# Patient Record
Sex: MALE | Race: WHITE | NOT HISPANIC OR LATINO
[De-identification: names, ages, dates, MRNs, and addresses within clinical notes are randomized per-mention and may not be internally consistent; named-entity substitution may affect disease eponyms.]

---

## 2019-08-04 PROBLEM — Z00.00 ENCOUNTER FOR PREVENTIVE HEALTH EXAMINATION: Status: ACTIVE | Noted: 2019-08-04

## 2019-08-30 ENCOUNTER — LABORATORY RESULT (OUTPATIENT)
Age: 34
End: 2019-08-30

## 2019-08-30 ENCOUNTER — APPOINTMENT (OUTPATIENT)
Dept: GASTROENTEROLOGY | Facility: CLINIC | Age: 34
End: 2019-08-30
Payer: COMMERCIAL

## 2019-08-30 VITALS
WEIGHT: 124 LBS | BODY MASS INDEX: 16.44 KG/M2 | HEIGHT: 73 IN | SYSTOLIC BLOOD PRESSURE: 100 MMHG | DIASTOLIC BLOOD PRESSURE: 70 MMHG | HEART RATE: 110 BPM | TEMPERATURE: 98.2 F | OXYGEN SATURATION: 97 %

## 2019-08-30 DIAGNOSIS — R63.4 ABNORMAL WEIGHT LOSS: ICD-10-CM

## 2019-08-30 DIAGNOSIS — M25.50 PAIN IN UNSPECIFIED JOINT: ICD-10-CM

## 2019-08-30 DIAGNOSIS — E53.8 DEFICIENCY OF OTHER SPECIFIED B GROUP VITAMINS: ICD-10-CM

## 2019-08-30 DIAGNOSIS — R51 HEADACHE: ICD-10-CM

## 2019-08-30 DIAGNOSIS — Z87.19 PERSONAL HISTORY OF OTHER DISEASES OF THE DIGESTIVE SYSTEM: ICD-10-CM

## 2019-08-30 PROCEDURE — 96372 THER/PROPH/DIAG INJ SC/IM: CPT

## 2019-08-30 PROCEDURE — 36415 COLL VENOUS BLD VENIPUNCTURE: CPT

## 2019-08-30 PROCEDURE — 99204 OFFICE O/P NEW MOD 45 MIN: CPT | Mod: 25

## 2019-08-30 RX ORDER — CYANOCOBALAMIN 1000 UG/ML
1000 INJECTION INTRAMUSCULAR; SUBCUTANEOUS
Qty: 0 | Refills: 0 | Status: COMPLETED | OUTPATIENT
Start: 2019-08-30

## 2019-08-30 RX ADMIN — CYANOCOBALAMIN 1000 MCG/ML: 1000 INJECTION INTRAMUSCULAR; SUBCUTANEOUS at 00:00

## 2019-08-30 NOTE — ASSESSMENT
[FreeTextEntry1] : 33M with PMHx anxiety and arthritis in wrists self referred for evaluation of SIBO and constipation. \par \par History of SIBO likely due to longstanding motility issue from constipation and history of food poisoning\par - discussed SIBO in detail, advised patient to give himself 4 hour intervals between meals \par - continue low fodmap diet for now but discussed not a good diet long term for microbiome \par - evaluate for malabsorption by checking vitamin A, E, K, prealbumin, vitamin B1, B2, b3 B6 magnesium and phosphorus. Vitamin D previously checked- 34.9\par - vitamin B12 low therefore Vitamin B12 injection today and recheck vitamin b12 and folate levels as well as anti-parietal and intrinsic factors\par - food allergy panel and specific foods tested patient identified \par - immunoglobulin panel, HIV screening, celiac genetic testing fecal calprotectin, fecal elastase, fecal fat collection, and ova and parasites \par - repeat SIBO breath test at [place] \par \par F/U with results\par \par Carolyn Arboleda NP

## 2019-08-30 NOTE — HISTORY OF PRESENT ILLNESS
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 \par \par Has had constipation for at least 5 years and was experiencing brain fog \par Diagnosed SIBO in March 2019- TREATED rifaximin, did not want neomycin because of tinnitus - did not help symptoms \par tries to do low fodmap - has slight bloating \par denies gas, stomach pain, blood in stool \par can't pinpoint foods that bother him, stopping caffeine helped a little bit, he has done a food diary\par lost 10 pounds in last year- changed diet (gluten free, dairy-free), chicken, fish, turkey, lamb, grass- fed red meat \par \par Now- brain fog with sleep disruption (waking up after 4-6 hours), constipation- everyday little bits  (does not get much of an urge) , palpitations \par \par : between 6 mo to a year \par delivery: vaginal\par PPI: none\par Infections: appendicitis in 2016 then appendectomy \par antibiotics: strep throat last year took antibiotic course\par motility: constipation \par 2 weeks after appendectomy went to [place] and got horrible food poisoning \par \par Fhx: father had bladder cancer (dx 45), brother has thyroid cancer (dx 37) \par Etoh: rarely \par Smoking: none\par Drug use: none\par was taking digestive enzymes \par \par blood work at previous GI showed platelets to be 133, Free T3 1.39, Vitamin B12 404, SIBO methane positive, otherwise normal labs

## 2019-08-30 NOTE — PHYSICAL EXAM
[General Appearance - Alert] : alert [General Appearance - In No Acute Distress] : in no acute distress [General Appearance - Well Nourished] : well nourished [Outer Ear] : the ears and nose were normal in appearance [Oropharynx] : the oropharynx was normal [Neck Appearance] : the appearance of the neck was normal [Neck Cervical Mass (___cm)] : no neck mass was observed [Heart Rate And Rhythm] : heart rate was normal and rhythm regular [Respiration, Rhythm And Depth] : normal respiratory rhythm and effort [Edema] : there was no peripheral edema [Bowel Sounds] : normal bowel sounds [Abdomen Soft] : soft [Abdomen Tenderness] : non-tender [Skin Color & Pigmentation] : normal skin color and pigmentation [Abnormal Walk] : normal gait [Skin Turgor] : normal skin turgor [] : no rash [Oriented To Time, Place, And Person] : oriented to person, place, and time [Impaired Insight] : insight and judgment were intact [Affect] : the affect was normal

## 2019-09-03 LAB
CRP SERPL-MCNC: <0.1 MG/DL
DEPRECATED KAPPA LC FREE/LAMBDA SER: 1.65 RATIO
FOLATE SERPL-MCNC: 8.1 NG/ML
HIV1+2 AB SPEC QL IA.RAPID: NONREACTIVE
IF BLOCK AB SER QL: NORMAL
IGA SER QL IEP: 313 MG/DL
IGG SER QL IEP: 921 MG/DL
IGM SER QL IEP: 76 MG/DL
KAPPA LC CSF-MCNC: 0.97 MG/DL
KAPPA LC SERPL-MCNC: 1.6 MG/DL
PCA AB SER QL IF: NORMAL
VIT B12 SERPL-MCNC: >2000 PG/ML

## 2019-09-04 ENCOUNTER — RESULT REVIEW (OUTPATIENT)
Age: 34
End: 2019-09-04

## 2019-09-05 LAB
A-TOCOPHEROL VIT E SERPL-MCNC: 17.1 MG/L
BETA+GAMMA TOCOPHEROL SERPL-MCNC: 1 MG/L
MAGNESIUM SERPL-MCNC: 2.4 MG/DL
PHOSPHATE SERPL-MCNC: 3 MG/DL
PREALB SERPL NEPH-MCNC: 27 MG/DL
VIT A SERPL-MCNC: 50.4 UG/DL

## 2019-09-06 LAB
ALMOND IGE QN: <0.1 KUA/L
AVACADO (F96) CONC: <0.1 KUA/L
CALPROTECTIN FECAL: <16 UG/G
DEPRECATED ALMOND IGE RAST QL: 0
DEPRECATED AVOCADO IGE RAST QL: 0
DEPRECATED KIWIFRUIT IGE RAST QL: <0.1 KUA/L
DEPRECATED ORANGE IGE RAST QL: 0
DEPRECATED PUMPKIN IGE RAST QL: 0
DEPRECATED SESAME SEED IGE RAST QL: 0
DEPRECATED STRAWBERRY IGE RAST QL: 0
DEPRECATED SUNFLOWER SEED IGE RAST QL: 0
DEPRECATED WALNUT IGE RAST QL: 0
KIWIFRUIT IGE QN: 0
ORANGE IGE QN: <0.1 KUA/L
PUMPKIN IGE QN: <0.1 KUA/L
SESAME SEED IGE QN: <0.1 KUA/L
STRAWBERRY IGE QN: <0.1 KUA/L
SUNFLOWER SEED IGE QN: <0.1 KUA/L
WALNUT IGE QN: <0.1 KUA/L

## 2019-09-09 LAB
BARLEY IGE QN: <0.1 KUA/L
BASOPHILS # BLD AUTO: 0.04 K/UL
BASOPHILS NFR BLD AUTO: 1.2 %
CHERRY IGE QN: <0.1 KUA/L
COW MILK IGE QN: <0.1 KUA/L
CRAB IGE QN: <0.1 KUA/L
DEPRECATED BARLEY IGE RAST QL: 0
DEPRECATED CHERRY IGE RAST QL: 0
DEPRECATED COW MILK IGE RAST QL: 0
DEPRECATED CRAB IGE RAST QL: 0
DEPRECATED EGG WHITE IGE RAST QL: 0
DEPRECATED OAT IGE RAST QL: 0
DEPRECATED PEANUT IGE RAST QL: 0
DEPRECATED RYE IGE RAST QL: 0
DEPRECATED SOYBEAN IGE RAST QL: 0
DEPRECATED WHEAT IGE RAST QL: 0
EGG WHITE IGE QN: <0.1 KUA/L
EOSINOPHIL # BLD AUTO: 0.06 K/UL
EOSINOPHIL NFR BLD AUTO: 1.8 %
HCT VFR BLD CALC: 43.6 %
HGB BLD-MCNC: 14.3 G/DL
IMM GRANULOCYTES NFR BLD AUTO: 0.3 %
LYMPHOCYTES # BLD AUTO: 1.19 K/UL
LYMPHOCYTES NFR BLD AUTO: 36 %
MAN DIFF?: NORMAL
MCHC RBC-ENTMCNC: 30.2 PG
MCHC RBC-ENTMCNC: 32.8 GM/DL
MCV RBC AUTO: 92.2 FL
MONOCYTES # BLD AUTO: 0.27 K/UL
MONOCYTES NFR BLD AUTO: 8.2 %
NEUTROPHILS # BLD AUTO: 1.74 K/UL
NEUTROPHILS NFR BLD AUTO: 52.5 %
OAT IGE QN: <0.1 KUA/L
PEANUT IGE QN: <0.1 KUA/L
PLATELET # BLD AUTO: 139 K/UL
RBC # BLD: 4.73 M/UL
RBC # FLD: 13.8 %
RYE IGE QN: <0.1 KUA/L
SOYBEAN IGE QN: <0.1 KUA/L
TOTAL IGE SMQN RAST: 3 KU/L
WBC # FLD AUTO: 3.31 K/UL
WHEAT IGE QN: <0.1 KUA/L

## 2019-09-11 LAB — DEPRECATED O AND P PREP STL: NORMAL

## 2019-09-13 ENCOUNTER — TRANSCRIPTION ENCOUNTER (OUTPATIENT)
Age: 34
End: 2019-09-13

## 2019-09-16 LAB
ANNOTATION COMMENT IMP: NORMAL
HLA-DQ2: POSITIVE
HLA-DQ8 QL: NEGATIVE
REF LAB TEST METHOD: NORMAL

## 2019-09-23 ENCOUNTER — TRANSCRIPTION ENCOUNTER (OUTPATIENT)
Age: 34
End: 2019-09-23

## 2019-09-26 ENCOUNTER — TRANSCRIPTION ENCOUNTER (OUTPATIENT)
Age: 34
End: 2019-09-26

## 2019-09-26 LAB
COPPER SERPL-MCNC: 98 UG/DL
INR PPP: 1.04 RATIO
NICOTINAMIDE: 20.7 NG/ML
NICOTINIC ACID: <5 NG/ML
PANCREATIC ELASTASE, FECAL: 253
PT BLD: 11.8 SEC
VIT B1 SERPL-MCNC: 104.4 NMOL/L
VIT B2 SERPL-MCNC: 175 UG/L
VIT B6 SERPL-MCNC: 46.5 UG/L
ZINC SERPL-MCNC: 60 UG/DL

## 2019-09-27 ENCOUNTER — TRANSCRIPTION ENCOUNTER (OUTPATIENT)
Age: 34
End: 2019-09-27

## 2019-10-11 ENCOUNTER — APPOINTMENT (OUTPATIENT)
Dept: GASTROENTEROLOGY | Facility: CLINIC | Age: 34
End: 2019-10-11
Payer: COMMERCIAL

## 2019-10-11 VITALS
HEIGHT: 73 IN | RESPIRATION RATE: 14 BRPM | SYSTOLIC BLOOD PRESSURE: 130 MMHG | BODY MASS INDEX: 16.44 KG/M2 | DIASTOLIC BLOOD PRESSURE: 80 MMHG | HEART RATE: 79 BPM | WEIGHT: 124 LBS | OXYGEN SATURATION: 98 %

## 2019-10-11 DIAGNOSIS — D69.6 THROMBOCYTOPENIA, UNSPECIFIED: ICD-10-CM

## 2019-10-11 PROCEDURE — 99215 OFFICE O/P EST HI 40 MIN: CPT

## 2019-10-11 NOTE — PHYSICAL EXAM
[General Appearance - Alert] : alert [General Appearance - In No Acute Distress] : in no acute distress [General Appearance - Well Nourished] : well nourished [Outer Ear] : the ears and nose were normal in appearance [Oropharynx] : the oropharynx was normal [Neck Appearance] : the appearance of the neck was normal [Neck Cervical Mass (___cm)] : no neck mass was observed [Respiration, Rhythm And Depth] : normal respiratory rhythm and effort [Heart Rate And Rhythm] : heart rate was normal and rhythm regular [Edema] : there was no peripheral edema [Bowel Sounds] : normal bowel sounds [Abdomen Soft] : soft [Abdomen Tenderness] : non-tender [Abnormal Walk] : normal gait [Skin Color & Pigmentation] : normal skin color and pigmentation [Skin Turgor] : normal skin turgor [] : no rash [Oriented To Time, Place, And Person] : oriented to person, place, and time [Impaired Insight] : insight and judgment were intact [Affect] : the affect was normal

## 2019-10-14 NOTE — ASSESSMENT
[FreeTextEntry1] : 33M with PMHx anxiety and arthritis in wrists self referred for evaluation of SIBO and constipation. \par \par History of SIBO likely due to longstanding motility issue from constipation and history of food poisoning\par - advised patient to continue with  4 hour intervals between meals \par - continue low fodmap diet for now but discussed not a good diet long term for microbiome \par - recent SIBO breath test- negative, therefore schedule patient for SmartPill and possible Motegrity depending on results \par - encouraged patient to discontinue supplements as taking several \par \par Low platelets/increased WBCs \par - Complete abdominal ultrasound \par \par F/U post SmartPill study \par \par Carolyn Arboleda, NP

## 2019-10-14 NOTE — HISTORY OF PRESENT ILLNESS
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 \par \par 10/11/19\par - brain fog, not sleeping well, body fatigue \par - little bits of stool daily for 5 years - sometimes takes magnesium citrate at night \par - never had EGD/colonoscopy\par - had b12 injection at last visit- did not notice a difference \par - low WBC/PLTs, HLA DQ2 positive \par - currently taking digest cold, triphala, oregano oil, vitamin D, Atrantil \par - in the past took alimed, berberine, motilpro\par \par \par Previous history:\par Has had constipation for at least 5 years and was experiencing brain fog \par Diagnosed SIBO in March 2019- TREATED rifaximin, did not want neomycin because of tinnitus - did not help symptoms \par tries to do low fodmap - has slight bloating \par denies gas, stomach pain, blood in stool \par can't pinpoint foods that bother him, stopping caffeine helped a little bit, he has done a food diary\par lost 10 pounds in last year- changed diet (gluten free, dairy-free), chicken, fish, turkey, lamb, grass- fed red meat \par \par Now- brain fog with sleep disruption (waking up after 4-6 hours), constipation- everyday little bits  (does not get much of an urge) , palpitations \par \par : between 6 mo to a year \par delivery: vaginal\par PPI: none\par Infections: appendicitis in 2016 then appendectomy \par antibiotics: strep throat last year took antibiotic course\par motility: constipation \par 2 weeks after appendectomy went to [place] and got horrible food poisoning \par \par Fhx: father had bladder cancer (dx 45), brother has thyroid cancer (dx 37) \par Etoh: rarely \par Smoking: none\par Drug use: none\par was taking digestive enzymes \par \par blood work at previous GI showed platelets to be 133, Free T3 1.39, Vitamin B12 404, SIBO methane positive, otherwise normal labs

## 2019-10-16 ENCOUNTER — APPOINTMENT (OUTPATIENT)
Dept: GASTROENTEROLOGY | Facility: CLINIC | Age: 34
End: 2019-10-16
Payer: COMMERCIAL

## 2019-10-16 VITALS
BODY MASS INDEX: 16.44 KG/M2 | WEIGHT: 124 LBS | HEART RATE: 63 BPM | OXYGEN SATURATION: 100 % | DIASTOLIC BLOOD PRESSURE: 75 MMHG | HEIGHT: 73 IN | RESPIRATION RATE: 14 BRPM | SYSTOLIC BLOOD PRESSURE: 90 MMHG

## 2019-10-16 VITALS — SYSTOLIC BLOOD PRESSURE: 111 MMHG | DIASTOLIC BLOOD PRESSURE: 78 MMHG

## 2019-10-16 VITALS — TEMPERATURE: 97.6 F

## 2019-10-16 PROCEDURE — 91112 GI WIRELESS CAPSULE MEASURE: CPT

## 2019-10-16 NOTE — HISTORY OF PRESENT ILLNESS
[de-identified] : 34M with PMHx anxiety and arthritis in wrists here for SmartPill Administration for evaluation of constipation \par \par 10/16/19\par - Patient presents today for SmartPill Administration. \par - Has not taken any laxatives or antacids in preparation for procedure. Still complains of constipation. Reports he did not sleep well last night and woke up at 2 am. He wonders if his gut issues could be causing sleep disturbances or visa versa. His initial blood pressure was 90/75 and attributes to lack of sleep and fasting overnight. Denies dizziness. He is anxious for results of study. \par \par Previous history: \par 10/11/19\par - brain fog, not sleeping well, body fatigue \par - little bits of stool daily for 5 years - sometimes takes magnesium citrate at night \par - never had EGD/colonoscopy\par - had b12 injection at last visit- did not notice a difference \par - low WBC/PLTs, HLA DQ2 positive \par - currently taking digest cold, triphala, oregano oil, vitamin D, Atrantil \par - in the past took alimed, berberine, motilpro\par \par Previous history:\par Has had constipation for at least 5 years and was experiencing brain fog \par Diagnosed SIBO in March 2019- TREATED rifaximin, did not want neomycin because of tinnitus - did not help symptoms \par tries to do low fodmap - has slight bloating \par denies gas, stomach pain, blood in stool \par can't pinpoint foods that bother him, stopping caffeine helped a little bit, he has done a food diary\par lost 10 pounds in last year- changed diet (gluten free, dairy-free), chicken, fish, turkey, lamb, grass- fed red meat \par \par Now- brain fog with sleep disruption (waking up after 4-6 hours), constipation- everyday little bits  (does not get much of an urge) , palpitations \par \par : between 6 mo to a year \par delivery: vaginal\par PPI: none\par Infections: appendicitis in 2016 then appendectomy \par antibiotics: strep throat last year took antibiotic course\par motility: constipation \par 2 weeks after appendectomy went to [place] and got horrible food poisoning \par \par Fhx: father had bladder cancer (dx 45), brother has thyroid cancer (dx 37) \par Etoh: rarely \par Smoking: none\par Drug use: none\par was taking digestive enzymes \par \par blood work at previous GI showed platelets to be 133, Free T3 1.39, Vitamin B12 404, SIBO methane positive, otherwise normal labs

## 2019-10-16 NOTE — HISTORY OF PRESENT ILLNESS
[de-identified] : 34M with PMHx anxiety and arthritis in wrists here for SmartPill Administration for evaluation of constipation \par \par 10/16/19\par - Patient presents today for SmartPill Administration. \par - Has not taken any laxatives or antacids in preparation for procedure. Still complains of constipation. Reports he did not sleep well last night and woke up at 2 am. He wonders if his gut issues could be causing sleep disturbances or visa versa. His initial blood pressure was 90/75 and attributes to lack of sleep and fasting overnight. Denies dizziness. He is anxious for results of study. \par \par Previous history: \par 10/11/19\par - brain fog, not sleeping well, body fatigue \par - little bits of stool daily for 5 years - sometimes takes magnesium citrate at night \par - never had EGD/colonoscopy\par - had b12 injection at last visit- did not notice a difference \par - low WBC/PLTs, HLA DQ2 positive \par - currently taking digest cold, triphala, oregano oil, vitamin D, Atrantil \par - in the past took alimed, berberine, motilpro\par \par Previous history:\par Has had constipation for at least 5 years and was experiencing brain fog \par Diagnosed SIBO in March 2019- TREATED rifaximin, did not want neomycin because of tinnitus - did not help symptoms \par tries to do low fodmap - has slight bloating \par denies gas, stomach pain, blood in stool \par can't pinpoint foods that bother him, stopping caffeine helped a little bit, he has done a food diary\par lost 10 pounds in last year- changed diet (gluten free, dairy-free), chicken, fish, turkey, lamb, grass- fed red meat \par \par Now- brain fog with sleep disruption (waking up after 4-6 hours), constipation- everyday little bits  (does not get much of an urge) , palpitations \par \par : between 6 mo to a year \par delivery: vaginal\par PPI: none\par Infections: appendicitis in 2016 then appendectomy \par antibiotics: strep throat last year took antibiotic course\par motility: constipation \par 2 weeks after appendectomy went to [place] and got horrible food poisoning \par \par Fhx: father had bladder cancer (dx 45), brother has thyroid cancer (dx 37) \par Etoh: rarely \par Smoking: none\par Drug use: none\par was taking digestive enzymes \par \par blood work at previous GI showed platelets to be 133, Free T3 1.39, Vitamin B12 404, SIBO methane positive, otherwise normal labs

## 2019-10-16 NOTE — PHYSICAL EXAM
[General Appearance - Alert] : alert [General Appearance - In No Acute Distress] : in no acute distress [General Appearance - Well Nourished] : well nourished [] : no respiratory distress [Respiration, Rhythm And Depth] : normal respiratory rhythm and effort [Heart Rate And Rhythm] : heart rate was normal and rhythm regular [Bowel Sounds] : normal bowel sounds [Abdomen Soft] : soft [Abdomen Tenderness] : non-tender [Oriented To Time, Place, And Person] : oriented to person, place, and time [Impaired Insight] : insight and judgment were intact [Affect] : the affect was normal

## 2019-10-16 NOTE — ASSESSMENT
[FreeTextEntry1] : Impression: 34M with PMHx anxiety and arthritis in wrists here for SmartPill Administration for evaluation of constipation to determine if underlying cause is dysmotility. \par \par SmartPill Administration:\par Patient presented to office having fasted for 8 hours.  I assessed for contraindications to SmartPill, none of which are present. We reviewed adverse events of SmartPill administration including capsule retention.  Patient ate the meal bar in the allotted 15 minutes and ingested the capsule without difficulty.  He was instructed to fast for an additional 6 hours during which he may sip one cup of water.  We discussed SmartPill recorder and diary use.  Patient verbalizes understanding. \par \par Rechecked BP after eating SmartBar and drinking half a cup of water, repeat reading 111/78. \par \par Disposition: Patient will return the SmartPill recorder and diary on Tuesday, October 22nd, 2019. He is instructed to contact the office if any questions arise.

## 2019-10-17 ENCOUNTER — FORM ENCOUNTER (OUTPATIENT)
Age: 34
End: 2019-10-17

## 2019-10-18 ENCOUNTER — APPOINTMENT (OUTPATIENT)
Dept: ULTRASOUND IMAGING | Facility: CLINIC | Age: 34
End: 2019-10-18
Payer: COMMERCIAL

## 2019-10-18 ENCOUNTER — OUTPATIENT (OUTPATIENT)
Dept: OUTPATIENT SERVICES | Facility: HOSPITAL | Age: 34
LOS: 1 days | End: 2019-10-18

## 2019-10-18 PROCEDURE — 76700 US EXAM ABDOM COMPLETE: CPT | Mod: 26

## 2019-10-21 ENCOUNTER — TRANSCRIPTION ENCOUNTER (OUTPATIENT)
Age: 34
End: 2019-10-21

## 2019-10-24 ENCOUNTER — TRANSCRIPTION ENCOUNTER (OUTPATIENT)
Age: 34
End: 2019-10-24

## 2019-10-29 ENCOUNTER — TRANSCRIPTION ENCOUNTER (OUTPATIENT)
Age: 34
End: 2019-10-29

## 2019-11-05 ENCOUNTER — APPOINTMENT (OUTPATIENT)
Dept: GASTROENTEROLOGY | Facility: CLINIC | Age: 34
End: 2019-11-05
Payer: COMMERCIAL

## 2019-11-05 PROCEDURE — 91112 GI WIRELESS CAPSULE MEASURE: CPT | Mod: 26

## 2019-11-07 ENCOUNTER — TRANSCRIPTION ENCOUNTER (OUTPATIENT)
Age: 34
End: 2019-11-07

## 2019-11-11 ENCOUNTER — TRANSCRIPTION ENCOUNTER (OUTPATIENT)
Age: 34
End: 2019-11-11

## 2019-11-26 ENCOUNTER — APPOINTMENT (OUTPATIENT)
Dept: GASTROENTEROLOGY | Facility: CLINIC | Age: 34
End: 2019-11-26
Payer: COMMERCIAL

## 2019-11-26 VITALS
TEMPERATURE: 98.1 F | BODY MASS INDEX: 17.49 KG/M2 | WEIGHT: 132 LBS | SYSTOLIC BLOOD PRESSURE: 109 MMHG | RESPIRATION RATE: 15 BRPM | DIASTOLIC BLOOD PRESSURE: 81 MMHG | OXYGEN SATURATION: 98 % | HEART RATE: 95 BPM | HEIGHT: 73 IN

## 2019-11-26 PROCEDURE — 36415 COLL VENOUS BLD VENIPUNCTURE: CPT

## 2019-11-26 PROCEDURE — 99215 OFFICE O/P EST HI 40 MIN: CPT | Mod: 25

## 2019-11-26 NOTE — PHYSICAL EXAM
[General Appearance - Alert] : alert [Outer Ear] : the ears and nose were normal in appearance [General Appearance - In No Acute Distress] : in no acute distress [General Appearance - Well Nourished] : well nourished [Neck Appearance] : the appearance of the neck was normal [Oropharynx] : the oropharynx was normal [Neck Cervical Mass (___cm)] : no neck mass was observed [Respiration, Rhythm And Depth] : normal respiratory rhythm and effort [Heart Rate And Rhythm] : heart rate was normal and rhythm regular [Bowel Sounds] : normal bowel sounds [Edema] : there was no peripheral edema [Abdomen Soft] : soft [Abnormal Walk] : normal gait [Abdomen Tenderness] : non-tender [Skin Turgor] : normal skin turgor [Skin Color & Pigmentation] : normal skin color and pigmentation [] : no rash [Affect] : the affect was normal [Oriented To Time, Place, And Person] : oriented to person, place, and time [Impaired Insight] : insight and judgment were intact

## 2019-11-27 NOTE — ASSESSMENT
[FreeTextEntry1] : 33M with PMHx anxiety and arthritis in wrists self referred for evaluation of SIBO and constipation currently NEGATIVE for SIBO and found to have COLONIC SLOW TRANSIT MOTILITY on SMART PILL\par 1. Slow transit motility\par History of SIBO likely due to longstanding motility issue from constipation and history of food poisoning but no current SIBO\par - will attempt to correct with lifestyle/diet and bowel retraining including morning high fiber smoothie and sitting on toilet... if not improved can consider supplements, medications\par - continue with 4 hour intervals between meals \par - reintroduce fodmaps\par - check autoimmune/scleroderma\par \par 2. Low platelets/increased WBCs \par - Complete abdominal ultrasound - no splenomegaly\par - f/u with hematology\par \par 3. Sleep \par - continue melatonin, sleep hygiene, stress/anxiety reduction\par \par 4. Liver hemangioma and gallbladder polyps\par - MRI in one year for stability and if stable, continue GB polyp surveillance

## 2019-11-27 NOTE — HISTORY OF PRESENT ILLNESS
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 \par 11/27/19\par - pt here to f/u on smart pill test showing prolonged CTT at 78:33\par - feels some improvement as he is doing daily yoga in AM, sleeping better\par - went to see Dr. WEBER for workup- did not have official follow up yet\par - NEGative for SIBO\par \par 10/11/19\par - brain fog, not sleeping well, body fatigue \par - little bits of stool daily for 5 years - sometimes takes magnesium citrate at night \par - never had EGD/colonoscopy\par - had b12 injection at last visit- did not notice a difference \par - low WBC/PLTs, HLA DQ2 positive \par - currently taking digest cold, triphala, oregano oil, vitamin D, Atrantil \par - in the past took alimed, berberine, motilpro\par \par \par Previous history:\par Has had constipation for at least 5 years and was experiencing brain fog \par Diagnosed SIBO in March 2019- TREATED rifaximin, did not want neomycin because of tinnitus - did not help symptoms \par tries to do low fodmap - has slight bloating \par denies gas, stomach pain, blood in stool \par can't pinpoint foods that bother him, stopping caffeine helped a little bit, he has done a food diary\par lost 10 pounds in last year- changed diet (gluten free, dairy-free), chicken, fish, turkey, lamb, grass- fed red meat \par \par Now- brain fog with sleep disruption (waking up after 4-6 hours), constipation- everyday little bits  (does not get much of an urge) , palpitations \par \par : between 6 mo to a year \par delivery: vaginal\par PPI: none\par Infections: appendicitis in 2016 then appendectomy \par antibiotics: strep throat last year took antibiotic course\par motility: constipation \par 2 weeks after appendectomy went to [place] and got horrible food poisoning \par \par Fhx: father had bladder cancer (dx 45), brother has thyroid cancer (dx 37) \par Etoh: rarely \par Smoking: none\par Drug use: none\par was taking digestive enzymes \par \par blood work at previous GI showed platelets to be 133, Free T3 1.39, Vitamin B12 404, SIBO methane positive, otherwise normal labs

## 2019-11-29 ENCOUNTER — TRANSCRIPTION ENCOUNTER (OUTPATIENT)
Age: 34
End: 2019-11-29

## 2019-11-29 LAB
ANA SER IF-ACNC: NEGATIVE
ENA SCL70 IGG SER IA-ACNC: <0.2 AL

## 2019-12-04 ENCOUNTER — TRANSCRIPTION ENCOUNTER (OUTPATIENT)
Age: 34
End: 2019-12-04

## 2020-01-17 ENCOUNTER — TRANSCRIPTION ENCOUNTER (OUTPATIENT)
Age: 35
End: 2020-01-17

## 2020-01-23 ENCOUNTER — APPOINTMENT (OUTPATIENT)
Dept: GASTROENTEROLOGY | Facility: CLINIC | Age: 35
End: 2020-01-23
Payer: COMMERCIAL

## 2020-01-23 VITALS
WEIGHT: 138 LBS | HEIGHT: 73 IN | DIASTOLIC BLOOD PRESSURE: 70 MMHG | BODY MASS INDEX: 18.29 KG/M2 | HEART RATE: 84 BPM | OXYGEN SATURATION: 98 % | SYSTOLIC BLOOD PRESSURE: 100 MMHG | RESPIRATION RATE: 14 BRPM

## 2020-01-23 DIAGNOSIS — K59.04 CHRONIC IDIOPATHIC CONSTIPATION: ICD-10-CM

## 2020-01-23 PROCEDURE — 99215 OFFICE O/P EST HI 40 MIN: CPT

## 2020-01-24 ENCOUNTER — TRANSCRIPTION ENCOUNTER (OUTPATIENT)
Age: 35
End: 2020-01-24

## 2020-01-24 NOTE — HISTORY OF PRESENT ILLNESS
[de-identified] : 34M with PMHx anxiety and arthritis in wrists self referred for evaluation of SIBO and constipation. \par \par 1/23/20\par - feeling more anxious\par - brain fog for about 10 days last week\par - constipation, every day going to the bathroom a little bit then every 3 days will have a bigger BM \par - tried MiraLAX for the past 5 days (after lunch) - subtle difference \par - tried morning smoothie and caused brain fog for 5 hours - wonders if it is because he put yogurt in it \par - never have taken a stronger laxative \par - been skipping breakfast past week and feels somewhat better \par \par 11/27/19\par - pt here to f/u on smart pill test showing prolonged CTT at 78:33\par - feels some improvement as he is doing daily yoga in AM, sleeping better\par - went to see Dr. WEBER for workup- did not have official follow up yet\par - NEGative for SIBO\par \par 10/11/19\par - brain fog, not sleeping well, body fatigue \par - little bits of stool daily for 5 years - sometimes takes magnesium citrate at night \par - never had EGD/colonoscopy\par - had b12 injection at last visit- did not notice a difference \par - low WBC/PLTs, HLA DQ2 positive \par - currently taking digest cold, triphala, oregano oil, vitamin D, Atrantil \par - in the past took alimed, berberine, motilpro\par \par \par Previous history:\par Has had constipation for at least 5 years and was experiencing brain fog \par Diagnosed SIBO in March 2019- TREATED rifaximin, did not want neomycin because of tinnitus - did not help symptoms \par tries to do low fodmap - has slight bloating \par denies gas, stomach pain, blood in stool \par can't pinpoint foods that bother him, stopping caffeine helped a little bit, he has done a food diary\par lost 10 pounds in last year- changed diet (gluten free, dairy-free), chicken, fish, turkey, lamb, grass- fed red meat \par \par Now- brain fog with sleep disruption (waking up after 4-6 hours), constipation- everyday little bits  (does not get much of an urge) , palpitations \par \par : between 6 mo to a year \par delivery: vaginal\par PPI: none\par Infections: appendicitis in 2016 then appendectomy \par antibiotics: strep throat last year took antibiotic course\par motility: constipation \par 2 weeks after appendectomy went to [place] and got horrible food poisoning \par \par Fhx: father had bladder cancer (dx 45), brother has thyroid cancer (dx 37) \par Etoh: rarely \par Smoking: none\par Drug use: none\par was taking digestive enzymes \par \par blood work at previous GI showed platelets to be 133, Free T3 1.39, Vitamin B12 404, SIBO methane positive, otherwise normal labs

## 2020-01-24 NOTE — ASSESSMENT
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 Slow transit motility\par History of SIBO likely due to longstanding motility issue from constipation and history of food poisoning but no current SIBO\par - attempted diet/lifestlye changes and magnesium and miralax but no improvement therefore patient would like to try route of low dose motegrity, ordered 0.5mg for patient to take. Advised to take at bedtime, same time every day. Reviewed adverse effects with patient. \par - discontinue 4 hour intervals between meals since patient is essentially doing intermittent fasting\par - nutritionist referral provided \par \par 2. Low platelets/increased WBCs \par - Complete abdominal ultrasound - no splenomegaly\par - f/u with hematology\par \par 3. Sleep \par - continue melatonin, sleep hygiene, stress/anxiety reduction\par - highly recommended sleep specialist at [facility], referral given \par - recommend patient see therapist as anxiety seems to drive a lot of symptoms he has, referral provided\par \par 4. Liver hemangioma and gallbladder polyps\par - MRI in one year for stability and if stable, continue GB polyp surveillance\par \par 5. Anxiety\par - psych referral

## 2020-01-27 ENCOUNTER — TRANSCRIPTION ENCOUNTER (OUTPATIENT)
Age: 35
End: 2020-01-27

## 2020-02-03 ENCOUNTER — TRANSCRIPTION ENCOUNTER (OUTPATIENT)
Age: 35
End: 2020-02-03

## 2020-02-13 ENCOUNTER — TRANSCRIPTION ENCOUNTER (OUTPATIENT)
Age: 35
End: 2020-02-13

## 2020-02-20 ENCOUNTER — TRANSCRIPTION ENCOUNTER (OUTPATIENT)
Age: 35
End: 2020-02-20

## 2020-02-24 ENCOUNTER — TRANSCRIPTION ENCOUNTER (OUTPATIENT)
Age: 35
End: 2020-02-24

## 2020-02-27 ENCOUNTER — APPOINTMENT (OUTPATIENT)
Dept: GASTROENTEROLOGY | Facility: CLINIC | Age: 35
End: 2020-02-27
Payer: COMMERCIAL

## 2020-02-27 VITALS
DIASTOLIC BLOOD PRESSURE: 60 MMHG | RESPIRATION RATE: 16 BRPM | HEIGHT: 73 IN | OXYGEN SATURATION: 98 % | SYSTOLIC BLOOD PRESSURE: 90 MMHG | BODY MASS INDEX: 18.42 KG/M2 | WEIGHT: 139 LBS | HEART RATE: 92 BPM

## 2020-02-27 PROCEDURE — 99215 OFFICE O/P EST HI 40 MIN: CPT

## 2020-02-27 NOTE — PHYSICAL EXAM
[General Appearance - Alert] : alert [General Appearance - In No Acute Distress] : in no acute distress [General Appearance - Well Nourished] : well nourished [Outer Ear] : the ears and nose were normal in appearance [Oropharynx] : the oropharynx was normal [Neck Appearance] : the appearance of the neck was normal [Neck Cervical Mass (___cm)] : no neck mass was observed [Respiration, Rhythm And Depth] : normal respiratory rhythm and effort [Heart Rate And Rhythm] : heart rate was normal and rhythm regular [Edema] : there was no peripheral edema [Bowel Sounds] : normal bowel sounds [Abdomen Soft] : soft [Abdomen Tenderness] : non-tender [Abnormal Walk] : normal gait [Skin Color & Pigmentation] : normal skin color and pigmentation [Skin Turgor] : normal skin turgor [] : no rash [No Focal Deficits] : no focal deficits [Oriented To Time, Place, And Person] : oriented to person, place, and time [Impaired Insight] : insight and judgment were intact [Affect] : the affect was normal

## 2020-02-27 NOTE — ASSESSMENT
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 Slow transit motility\par History of SIBO likely due to longstanding motility issue from constipation and history of food poisoning but no current SIBO\par - attempted diet/lifestlye changes and magnesium and miralax that didn’t work-- but now on senna it is working and has improved other symptoms. Advised to take at bedtime, same time every day. \par - discontinue 4 hour intervals between meals since patient is essentially doing intermittent fasting\par - nutritionist referral \par \par 2. Low platelets/increased WBCs \par - Complete abdominal ultrasound - no splenomegaly\par - f/u with hematology\par \par 3. Sleep improving with improved bowel movements\par - continue melatonin, sleep hygiene, stress/anxiety reduction\par - sleep specialist at [facility] if sleeping worsens\par - recommend patient see therapist as anxiety seems to drive a lot of symptoms he has, referral provided\par \par 4. Liver hemangioma and gallbladder polyps\par - MRI in one year for stability and if stable, continue GB polyp surveillance\par \par 5. Anxiety\par - psych referral\par \par F/u 2 mo

## 2020-03-07 ENCOUNTER — TRANSCRIPTION ENCOUNTER (OUTPATIENT)
Age: 35
End: 2020-03-07

## 2020-04-28 ENCOUNTER — APPOINTMENT (OUTPATIENT)
Dept: GASTROENTEROLOGY | Facility: CLINIC | Age: 35
End: 2020-04-28
Payer: COMMERCIAL

## 2020-04-28 PROCEDURE — 99214 OFFICE O/P EST MOD 30 MIN: CPT | Mod: 95

## 2020-04-28 RX ORDER — PRUCALOPRIDE 1 MG/1
1 TABLET, FILM COATED ORAL
Qty: 30 | Refills: 0 | Status: DISCONTINUED | COMMUNITY
Start: 2020-01-23 | End: 2020-04-28

## 2020-04-28 NOTE — HISTORY OF PRESENT ILLNESS
[Home] : at home, [unfilled] , at the time of the visit. [Patient] : the patient [Self] : self [Other Location: e.g. Home (Enter Location, City,State)___] : at [unfilled] [de-identified] : 34M with PMHx anxiety and arthritis in wrists self referred for evaluation of SIBO and constipation, here for follow up \par \par 4/28/20 \par - pt requests TELEMEDICINE visit today for constipation \par - eating healthy and has been cooking most meals at home\par - drinking senna tea and has magnesium citrate on hand\par - senna gives him urge to go bathroom - no negative side effects \par - had bad brain fog for 7 days so has been taking Zinc and feels better, however unsure if from Zinc or mushrooms he was eating \par - sleeping somewhat better \par - saw Dr. Burrell regarding low platelets and pt reports he was not concerned \par - patient still unsure regarding diet and certain foods that may be causing to brain fog, appt with Linda from nutrition next month \par \par 2/27/20\par - pt feeling much improved- having improved bowel movements even sometimes loose\par - less brain fog, better sleeping\par - able to expand diet\par - did not see psych or sleep docs\par \par \par 1/23/20\par - feeling more anxious\par - brain fog for about 10 days last week\par - constipation, every day going to the bathroom a little bit then every 3 days will have a bigger BM \par - tried MiraLAX for the past 5 days (after lunch) - subtle difference \par - tried morning smoothie and caused brain fog for 5 hours - wonders if it is because he put yogurt in it \par - never have taken a stronger laxative \par - been skipping breakfast past week and feels somewhat better \par \par 11/27/19\par - pt here to f/u on smart pill test showing prolonged CTT at 78:33\par - feels some improvement as he is doing daily yoga in AM, sleeping better\par - went to see Dr. WEBER for workup- did not have official follow up yet\par - NEGative for SIBO\par \par 10/11/19\par - brain fog, not sleeping well, body fatigue \par - little bits of stool daily for 5 years - sometimes takes magnesium citrate at night \par - never had EGD/colonoscopy\par - had b12 injection at last visit- did not notice a difference \par - low WBC/PLTs, HLA DQ2 positive \par - currently taking digest cold, triphala, oregano oil, vitamin D, Atrantil \par - in the past took alimed, berberine, motilpro\par \par \par Previous history:\par Has had constipation for at least 5 years and was experiencing brain fog \par Diagnosed SIBO in March 2019- TREATED rifaximin, did not want neomycin because of tinnitus - did not help symptoms \par tries to do low fodmap - has slight bloating \par denies gas, stomach pain, blood in stool \par can't pinpoint foods that bother him, stopping caffeine helped a little bit, he has done a food diary\par lost 10 pounds in last year- changed diet (gluten free, dairy-free), chicken, fish, turkey, lamb, grass- fed red meat \par \par Now- brain fog with sleep disruption (waking up after 4-6 hours), constipation- everyday little bits  (does not get much of an urge) , palpitations \par \par : between 6 mo to a year \par delivery: vaginal\par PPI: none\par Infections: appendicitis in 2016 then appendectomy \par antibiotics: strep throat last year took antibiotic course\par motility: constipation \par 2 weeks after appendectomy went to [place] and got horrible food poisoning \par \par Fhx: father had bladder cancer (dx 45), brother has thyroid cancer (dx 37) \par Etoh: rarely \par Smoking: none\par Drug use: none\par was taking digestive enzymes \par \par blood work at previous GI showed platelets to be 133, Free T3 1.39, Vitamin B12 404, SIBO methane positive, otherwise normal labs

## 2020-04-28 NOTE — ASSESSMENT
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 Slow transit motility\par History of SIBO likely due to longstanding motility issue from constipation and history of food poisoning but no current SIBO\par - continue diet/lifestyle changes\par - on senna which has been helping and has improved other symptoms. Advised to try taking magnesium citrate every night and senna every other day \par - follow up after nutritionist appt \par - ? IBS Smart test, hold off on anymore testing for now but will keep in mind \par \par 2. Low platelets/increased WBCs \par - continue to monitor platelets, no further intervention at this time \par \par 3. Sleep improving with improved bowel movements\par - continue melatonin, sleep hygiene, stress/anxiety reduction\par - sleep specialist at [facility] if sleeping worsens\par - recommend patient see therapist as anxiety seems to drive a lot of symptoms he has, referral provided\par \par 4. Liver hemangioma and gallbladder polyps\par - MRI in one year for stability and if stable, continue GB polyp surveillance\par \par 5. Anxiety\par - psych referral\par \par F/u after nutritionist appt with Linda\par \par Carolyn Arboleda NP \par \par time spent 40min

## 2020-05-18 ENCOUNTER — APPOINTMENT (OUTPATIENT)
Dept: INTERNAL MEDICINE | Facility: CLINIC | Age: 35
End: 2020-05-18
Payer: SELF-PAY

## 2020-05-18 VITALS — BODY MASS INDEX: 18.21 KG/M2 | WEIGHT: 138 LBS

## 2020-05-18 PROCEDURE — 97802 MEDICAL NUTRITION INDIV IN: CPT | Mod: 95

## 2020-05-22 ENCOUNTER — APPOINTMENT (OUTPATIENT)
Dept: INTERNAL MEDICINE | Facility: CLINIC | Age: 35
End: 2020-05-22

## 2020-06-09 ENCOUNTER — APPOINTMENT (OUTPATIENT)
Dept: INTERNAL MEDICINE | Facility: CLINIC | Age: 35
End: 2020-06-09
Payer: COMMERCIAL

## 2020-06-09 DIAGNOSIS — K59.00 CONSTIPATION, UNSPECIFIED: ICD-10-CM

## 2020-06-09 PROCEDURE — 97803 MED NUTRITION INDIV SUBSEQ: CPT | Mod: 95

## 2020-06-10 VITALS — BODY MASS INDEX: 18.08 KG/M2 | WEIGHT: 137 LBS

## 2020-06-10 PROBLEM — K59.00 CONSTIPATION: Status: ACTIVE | Noted: 2019-08-30

## 2020-07-09 ENCOUNTER — APPOINTMENT (OUTPATIENT)
Dept: INTERNAL MEDICINE | Facility: CLINIC | Age: 35
End: 2020-07-09
Payer: COMMERCIAL

## 2020-07-09 PROCEDURE — 97803 MED NUTRITION INDIV SUBSEQ: CPT | Mod: 95

## 2020-08-20 ENCOUNTER — APPOINTMENT (OUTPATIENT)
Dept: INTERNAL MEDICINE | Facility: CLINIC | Age: 35
End: 2020-08-20

## 2022-03-23 ENCOUNTER — TRANSCRIPTION ENCOUNTER (OUTPATIENT)
Age: 37
End: 2022-03-23

## 2022-03-24 ENCOUNTER — TRANSCRIPTION ENCOUNTER (OUTPATIENT)
Age: 37
End: 2022-03-24

## 2022-04-08 ENCOUNTER — APPOINTMENT (OUTPATIENT)
Dept: GASTROENTEROLOGY | Facility: CLINIC | Age: 37
End: 2022-04-08
Payer: COMMERCIAL

## 2022-04-08 DIAGNOSIS — K76.89 OTHER SPECIFIED DISEASES OF LIVER: ICD-10-CM

## 2022-04-08 PROCEDURE — 99215 OFFICE O/P EST HI 40 MIN: CPT | Mod: 95

## 2022-04-08 RX ORDER — LINACLOTIDE 72 UG/1
72 CAPSULE, GELATIN COATED ORAL
Qty: 90 | Refills: 2 | Status: DISCONTINUED | COMMUNITY
Start: 2022-04-08 | End: 2022-04-08

## 2022-04-08 NOTE — HISTORY OF PRESENT ILLNESS
[Home] : at home, [unfilled] , at the time of the visit. [Patient] : the patient [Self] : self [Medical Office: ([facility])___] : at the medical office located in  [de-identified] : 34M with PMHx anxiety and arthritis in wrists self referred for evaluation of SIBO and constipation, here for follow up \par 4/8/22\par worsening brain fog recently\par not eating anything different\par on senna\par worse if doesn’t sleep well\par bristol 4ish\par sibo test negative\par inc permeatbility negative\par \par 4/28/20 \par - pt requests TELEMEDICINE visit today for constipation \par - eating healthy and has been cooking most meals at home\par - drinking senna tea and has magnesium citrate on hand\par - senna gives him urge to go bathroom - no negative side effects \par - had bad brain fog for 7 days so has been taking Zinc and feels better, however unsure if from Zinc or mushrooms he was eating \par - sleeping somewhat better \par - saw Dr. Burrell regarding low platelets and pt reports he was not concerned \par - patient still unsure regarding diet and certain foods that may be causing to brain fog, appt with Linda from nutrition next month \par \par 2/27/20\par - pt feeling much improved- having improved bowel movements even sometimes loose\par - less brain fog, better sleeping\par - able to expand diet\par - did not see psych or sleep docs\par \par \par 1/23/20\par - feeling more anxious\par - brain fog for about 10 days last week\par - constipation, every day going to the bathroom a little bit then every 3 days will have a bigger BM \par - tried MiraLAX for the past 5 days (after lunch) - subtle difference \par - tried morning smoothie and caused brain fog for 5 hours - wonders if it is because he put yogurt in it \par - never have taken a stronger laxative \par - been skipping breakfast past week and feels somewhat better \par \par 11/27/19\par - pt here to f/u on smart pill test showing prolonged CTT at 78:33\par - feels some improvement as he is doing daily yoga in AM, sleeping better\par - went to see Dr. WEBER for workup- did not have official follow up yet\par - NEGative for SIBO\par \par 10/11/19\par - brain fog, not sleeping well, body fatigue \par - little bits of stool daily for 5 years - sometimes takes magnesium citrate at night \par - never had EGD/colonoscopy\par - had b12 injection at last visit- did not notice a difference \par - low WBC/PLTs, HLA DQ2 positive \par - currently taking digest cold, triphala, oregano oil, vitamin D, Atrantil \par - in the past took alimed, berberine, motilpro\par \par \par Previous history:\par Has had constipation for at least 5 years and was experiencing brain fog \par Diagnosed SIBO in March 2019- TREATED rifaximin, did not want neomycin because of tinnitus - did not help symptoms \par tries to do low fodmap - has slight bloating \par denies gas, stomach pain, blood in stool \par can't pinpoint foods that bother him, stopping caffeine helped a little bit, he has done a food diary\par lost 10 pounds in last year- changed diet (gluten free, dairy-free), chicken, fish, turkey, lamb, grass- fed red meat \par \par Now- brain fog with sleep disruption (waking up after 4-6 hours), constipation- everyday little bits  (does not get much of an urge) , palpitations \par \par : between 6 mo to a year \par delivery: vaginal\par PPI: none\par Infections: appendicitis in 2016 then appendectomy \par antibiotics: strep throat last year took antibiotic course\par motility: constipation \par 2 weeks after appendectomy went to [place] and got horrible food poisoning \par \par Fhx: father had bladder cancer (dx 45), brother has thyroid cancer (dx 37) \par Etoh: rarely \par Smoking: none\par Drug use: none\par was taking digestive enzymes \par \par blood work at previous GI showed platelets to be 133, Free T3 1.39, Vitamin B12 404, SIBO methane positive, otherwise normal labs

## 2022-04-08 NOTE — ASSESSMENT
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 Slow transit motility\par History of SIBO likely due to longstanding motility issue from constipation and history of food poisoning but no current SIBO\par - continue diet/lifestyle changes\par - will switch from SENNA to LINZESS as senna not optimizing stool; counseled on starting at 72 and titrating up to bristol 4s\par - f/u with nutritionist as needed\par \par 2. Low platelets/increased WBCs \par - continue to monitor platelets, no further intervention at this time \par \par 3. Sleep improving with improved bowel movements\par - sleep specialist referral\par \par 4. Liver hemangioma and gallbladder polyps\par - MRI \par \par 5. Anxiety\par - psych referral\par \par 6. brain fog\par - remove eggs which are trigger, improve sleep, avoid cologne that roommate is making\par - check labs\par \par Fu after MRI/~june

## 2022-04-29 ENCOUNTER — OUTPATIENT (OUTPATIENT)
Dept: OUTPATIENT SERVICES | Facility: HOSPITAL | Age: 37
LOS: 1 days | End: 2022-04-29

## 2022-04-29 ENCOUNTER — APPOINTMENT (OUTPATIENT)
Dept: MRI IMAGING | Facility: CLINIC | Age: 37
End: 2022-04-29
Payer: COMMERCIAL

## 2022-04-29 PROCEDURE — 74183 MRI ABD W/O CNTR FLWD CNTR: CPT | Mod: 26

## 2022-05-03 ENCOUNTER — TRANSCRIPTION ENCOUNTER (OUTPATIENT)
Age: 37
End: 2022-05-03

## 2022-05-11 ENCOUNTER — TRANSCRIPTION ENCOUNTER (OUTPATIENT)
Age: 37
End: 2022-05-11

## 2022-05-17 ENCOUNTER — APPOINTMENT (OUTPATIENT)
Dept: PULMONOLOGY | Facility: CLINIC | Age: 37
End: 2022-05-17
Payer: COMMERCIAL

## 2022-05-17 VITALS
SYSTOLIC BLOOD PRESSURE: 108 MMHG | HEIGHT: 73 IN | HEART RATE: 79 BPM | WEIGHT: 138 LBS | BODY MASS INDEX: 18.29 KG/M2 | TEMPERATURE: 97 F | OXYGEN SATURATION: 98 % | DIASTOLIC BLOOD PRESSURE: 68 MMHG

## 2022-05-17 PROCEDURE — 99204 OFFICE O/P NEW MOD 45 MIN: CPT

## 2022-05-18 NOTE — CONSULT LETTER
[Dear  ___] : Dear  [unfilled], [Consult Letter:] : I had the pleasure of evaluating your patient, [unfilled]. [Please see my note below.] : Please see my note below. [Consult Closing:] : Thank you very much for allowing me to participate in the care of this patient.  If you have any questions, please do not hesitate to contact me. [Sincerely,] : Sincerely, [FreeTextEntry3] : Ally Santillan MD\par \par [employer] [facility]\par Pulmonary, Critical Care, and Sleep Medicine\par

## 2022-05-18 NOTE — PHYSICAL EXAM
[General Appearance - Well Developed] : well developed [Normal Appearance] : normal appearance [Well Groomed] : well groomed [General Appearance - Well Nourished] : well nourished [No Deformities] : no deformities [General Appearance - In No Acute Distress] : no acute distress [Normal Conjunctiva] : the conjunctiva exhibited no abnormalities [II] : II [Neck Appearance] : the appearance of the neck was normal [Apical Impulse] : the apical impulse was normal [Heart Rate And Rhythm] : heart rate was normal and rhythm regular [] : no respiratory distress [Respiration, Rhythm And Depth] : normal respiratory rhythm and effort [Exaggerated Use Of Accessory Muscles For Inspiration] : no accessory muscle use [Auscultation Breath Sounds / Voice Sounds] : lungs were clear to auscultation bilaterally [Abnormal Walk] : normal gait [Nail Clubbing] : no clubbing of the fingernails [Oriented To Time, Place, And Person] : oriented to person, place, and time [Impaired Insight] : insight and judgment were intact

## 2022-05-18 NOTE — ASSESSMENT
[FreeTextEntry1] : 37yo with hx of anxiety and SIBO with insomnia. Referred by Dr. Pretty. Insomnia may be related to stress and anxiety but can also be secondary to an undiagnosed sleep disorder. He also has some level of sleep deprivation and strategies to improve this were discussed, as well as sleep hygiene. Discussed having realistic expectations about sleep. Referred to the [facility] for an HST. Follow up after HST.

## 2022-05-18 NOTE — HISTORY OF PRESENT ILLNESS
[FreeTextEntry1] : 35yo with hx of anxiety and SIBO with insomnia. He thinks insomnia started 2 years ago when he was diagnosed with SIBO. Has a hard time falling and staying asleep. Has a diet that is supposed to improve sleep. Does have a high level of stress around his sleeping patterns. Does not thinks he snores but he is not sure. When he sleep over 7 hours he feels better, and has less brain fog. When he sleeps less he has brain fog which bothers him extensively.  [ESS] : 5

## 2022-05-27 ENCOUNTER — APPOINTMENT (OUTPATIENT)
Dept: SLEEP CENTER | Facility: HOME HEALTH | Age: 37
End: 2022-05-27
Payer: COMMERCIAL

## 2022-05-27 ENCOUNTER — OUTPATIENT (OUTPATIENT)
Dept: OUTPATIENT SERVICES | Facility: HOSPITAL | Age: 37
LOS: 1 days | End: 2022-05-27
Payer: COMMERCIAL

## 2022-05-27 PROCEDURE — 95800 SLP STDY UNATTENDED: CPT

## 2022-05-27 PROCEDURE — 95800 SLP STDY UNATTENDED: CPT | Mod: 26

## 2022-05-31 DIAGNOSIS — G47.33 OBSTRUCTIVE SLEEP APNEA (ADULT) (PEDIATRIC): ICD-10-CM

## 2022-06-06 ENCOUNTER — APPOINTMENT (OUTPATIENT)
Dept: PULMONOLOGY | Facility: CLINIC | Age: 37
End: 2022-06-06
Payer: COMMERCIAL

## 2022-06-06 VITALS
WEIGHT: 135 LBS | OXYGEN SATURATION: 97 % | SYSTOLIC BLOOD PRESSURE: 113 MMHG | BODY MASS INDEX: 17.89 KG/M2 | DIASTOLIC BLOOD PRESSURE: 73 MMHG | TEMPERATURE: 97.6 F | HEIGHT: 73 IN | HEART RATE: 97 BPM

## 2022-06-06 DIAGNOSIS — F51.04 PSYCHOPHYSIOLOGIC INSOMNIA: ICD-10-CM

## 2022-06-06 PROCEDURE — 99214 OFFICE O/P EST MOD 30 MIN: CPT

## 2022-06-06 NOTE — ASSESSMENT
[FreeTextEntry1] : REVIEWED\par HST 5/27/2022: AHI 3.5; vanna O2 saturation 86 (artifact)\par \par 35yo with hx of anxiety and SIBO with insomnia. Referred by Dr. Pretty. HST does not show significant KAVON. Can pursue a PSG for definitive diagnosis but my impression is that he would most benefit from CBT-i. Reinforced sleep hygiene as well as strategies to de-emphasize sleep. Also addressed having realistic expectations regarding sleep patterns. CBT-i would be an out of pocket expense for him so gave some online/vianey options. Follow up in 2 to 3 months, sooner if needed.\par \par

## 2022-06-06 NOTE — HISTORY OF PRESENT ILLNESS
[FreeTextEntry1] : 35yo with hx of anxiety and SIBO with insomnia. He thinks insomnia started 2 years ago when he was diagnosed with SIBO. Has a hard time falling and staying asleep. Has a diet that is supposed to improve sleep. Does have a high level of stress around his sleeping patterns. Does not thinks he snores but he is not sure. When he sleep over 7 hours he feels better, and has less brain fog. When he sleeps less he has brain fog which bothers him extensively. \par \par 6/6/2022\par Continues to have issue with sleep maintenance more so than initiation. Light sleeper with sounds waking him up.  [ESS] : 5

## 2022-06-06 NOTE — CONSULT LETTER
[Dear  ___] : Dear  [unfilled], [Courtesy Letter:] : I had the pleasure of seeing your patient, [unfilled], in my office today. [Please see my note below.] : Please see my note below. [Consult Closing:] : Thank you very much for allowing me to participate in the care of this patient.  If you have any questions, please do not hesitate to contact me. [Sincerely,] : Sincerely, [FreeTextEntry3] : Ally Santillan MD\par \par [employer] [facility]\par Pulmonary, Critical Care, and Sleep Medicine\par

## 2022-08-08 ENCOUNTER — TRANSCRIPTION ENCOUNTER (OUTPATIENT)
Age: 37
End: 2022-08-08

## 2022-08-08 LAB
25(OH)D3 SERPL-MCNC: 37.6 NG/ML
ALBUMIN SERPL ELPH-MCNC: 4.9 G/DL
ALP BLD-CCNC: 73 U/L
ALT SERPL-CCNC: 15 U/L
ANION GAP SERPL CALC-SCNC: 9 MMOL/L
AST SERPL-CCNC: 13 U/L
BASOPHILS # BLD AUTO: 0.04 K/UL
BASOPHILS NFR BLD AUTO: 0.9 %
BILIRUB SERPL-MCNC: 0.5 MG/DL
BUN SERPL-MCNC: 12 MG/DL
CALCIUM SERPL-MCNC: 9.9 MG/DL
CHLORIDE SERPL-SCNC: 105 MMOL/L
CO2 SERPL-SCNC: 26 MMOL/L
CREAT SERPL-MCNC: 0.81 MG/DL
CRP SERPL-MCNC: <3 MG/L
EGFR: 116 ML/MIN/1.73M2
EOSINOPHIL # BLD AUTO: 0.19 K/UL
EOSINOPHIL NFR BLD AUTO: 4.5 %
FERRITIN SERPL-MCNC: 83 NG/ML
FOLATE SERPL-MCNC: >20 NG/ML
GLUCOSE SERPL-MCNC: 92 MG/DL
HCT VFR BLD CALC: 49.7 %
HGB BLD-MCNC: 16.8 G/DL
IMM GRANULOCYTES NFR BLD AUTO: 0.2 %
IRON SATN MFR SERPL: 24 %
IRON SERPL-MCNC: 72 UG/DL
LYMPHOCYTES # BLD AUTO: 1.1 K/UL
LYMPHOCYTES NFR BLD AUTO: 25.8 %
MAN DIFF?: NORMAL
MCHC RBC-ENTMCNC: 30 PG
MCHC RBC-ENTMCNC: 33.8 GM/DL
MCV RBC AUTO: 88.8 FL
MONOCYTES # BLD AUTO: 0.45 K/UL
MONOCYTES NFR BLD AUTO: 10.6 %
NEUTROPHILS # BLD AUTO: 2.47 K/UL
NEUTROPHILS NFR BLD AUTO: 58 %
PLATELET # BLD AUTO: 162 K/UL
POTASSIUM SERPL-SCNC: 4.9 MMOL/L
PROT SERPL-MCNC: 6.9 G/DL
RBC # BLD: 5.6 M/UL
RBC # FLD: 13.1 %
SODIUM SERPL-SCNC: 141 MMOL/L
TIBC SERPL-MCNC: 298 UG/DL
TSH SERPL-ACNC: 2.25 UIU/ML
UIBC SERPL-MCNC: 227 UG/DL
VIT B12 SERPL-MCNC: 304 PG/ML
WBC # FLD AUTO: 4.26 K/UL

## 2022-08-17 ENCOUNTER — APPOINTMENT (OUTPATIENT)
Dept: GASTROENTEROLOGY | Facility: CLINIC | Age: 37
End: 2022-08-17

## 2022-08-17 RX ORDER — CYANOCOBALAMIN 1000 UG/ML
1000 INJECTION INTRAMUSCULAR; SUBCUTANEOUS
Qty: 0 | Refills: 0 | Status: COMPLETED | OUTPATIENT
Start: 2022-08-17

## 2022-08-19 ENCOUNTER — APPOINTMENT (OUTPATIENT)
Dept: GASTROENTEROLOGY | Facility: CLINIC | Age: 37
End: 2022-08-19

## 2022-08-19 PROCEDURE — 99214 OFFICE O/P EST MOD 30 MIN: CPT | Mod: 95

## 2022-08-19 NOTE — REASON FOR VISIT
[Home] : at home, [unfilled] , at the time of the visit. [Medical Office: ([facility])___] : at the medical office located in  [Patient] : the patient [Self] : self [Follow-Up: _____] : a [unfilled] follow-up visit

## 2022-08-22 NOTE — HISTORY OF PRESENT ILLNESS
[Home] : at home, [unfilled] , at the time of the visit. 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 he is plant based \par - linzess 72 working well helps brain fog but sometimes gets diarrhea has not done stool diary\par - seeing sleep doc \par \par 4/8/22\par worsening brain fog recently\par not eating anything different\par on senna\par worse if doesn’t sleep well\par bristol 4ish\par sibo test negative\par inc permeatbility negative\par \par 4/28/20 \par - pt requests TELEMEDICINE visit today for constipation \par - eating healthy and has been cooking most meals at home\par - drinking senna tea and has magnesium citrate on hand\par - senna gives him urge to go bathroom - no negative side effects \par - had bad brain fog for 7 days so has been taking Zinc and feels better, however unsure if from Zinc or mushrooms he was eating \par - sleeping somewhat better \par - saw Dr. Burrell regarding low platelets and pt reports he was not concerned \par - patient still unsure regarding diet and certain foods that may be causing to brain fog, appt with Linda from nutrition next month \par \par 2/27/20\par - pt feeling much improved- having improved bowel movements even sometimes loose\par - less brain fog, better sleeping\par - able to expand diet\par - did not see psych or sleep docs\par \par \par 1/23/20\par - feeling more anxious\par - brain fog for about 10 days last week\par - constipation, every day going to the bathroom a little bit then every 3 days will have a bigger BM \par - tried MiraLAX for the past 5 days (after lunch) - subtle difference \par - tried morning smoothie and caused brain fog for 5 hours - wonders if it is because he put yogurt in it \par - never have taken a stronger laxative \par - been skipping breakfast past week and feels somewhat better \par \par 11/27/19\par - pt here to f/u on smart pill test showing prolonged CTT at 78:33\par - feels some improvement as he is doing daily yoga in AM, sleeping better\par - went to see Dr. WEBER for workup- did not have official follow up yet\par - NEGative for SIBO\par \par 10/11/19\par - brain fog, not sleeping well, body fatigue \par - little bits of stool daily for 5 years - sometimes takes magnesium citrate at night \par - never had EGD/colonoscopy\par - had b12 injection at last visit- did not notice a difference \par - low WBC/PLTs, HLA DQ2 positive \par - currently taking digest cold, triphala, oregano oil, vitamin D, Atrantil \par - in the past took alimed, berberine, motilpro\par \par \par Previous history:\par Has had constipation for at least 5 years and was experiencing brain fog \par Diagnosed SIBO in March 2019- TREATED rifaximin, did not want neomycin because of tinnitus - did not help symptoms \par tries to do low fodmap - has slight bloating \par denies gas, stomach pain, blood in stool \par can't pinpoint foods that bother him, stopping caffeine helped a little bit, he has done a food diary\par lost 10 pounds in last year- changed diet (gluten free, dairy-free), chicken, fish, turkey, lamb, grass- fed red meat \par \par Now- brain fog with sleep disruption (waking up after 4-6 hours), constipation- everyday little bits  (does not get much of an urge) , palpitations \par \par : between 6 mo to a year \par delivery: vaginal\par PPI: none\par Infections: appendicitis in 2016 then appendectomy \par antibiotics: strep throat last year took antibiotic course\par motility: constipation \par 2 weeks after appendectomy went to [place] and got horrible food poisoning \par \par Fhx: father had bladder cancer (dx 45), brother has thyroid cancer (dx 37) \par Etoh: rarely \par Smoking: none\par Drug use: none\par was taking digestive enzymes \par \par blood work at previous GI showed platelets to be 133, Free T3 1.39, Vitamin B12 404, SIBO methane positive, otherwise normal labs

## 2022-10-20 ENCOUNTER — TRANSCRIPTION ENCOUNTER (OUTPATIENT)
Age: 37
End: 2022-10-20

## 2022-10-24 ENCOUNTER — TRANSCRIPTION ENCOUNTER (OUTPATIENT)
Age: 37
End: 2022-10-24

## 2022-11-09 ENCOUNTER — APPOINTMENT (OUTPATIENT)
Dept: GASTROENTEROLOGY | Facility: HOSPITAL | Age: 37
End: 2022-11-09

## 2022-11-10 ENCOUNTER — APPOINTMENT (OUTPATIENT)
Dept: GASTROENTEROLOGY | Facility: CLINIC | Age: 37
End: 2022-11-10

## 2022-11-10 PROCEDURE — 99214 OFFICE O/P EST MOD 30 MIN: CPT | Mod: 95

## 2022-11-10 NOTE — REASON FOR VISIT
[Follow-up] : a follow-up of an existing diagnosis [Home] : at home, [unfilled] , at the time of the visit. [Medical Office: ([facility])___] : at the medical office located in  [Patient] : the patient [Self] : self [Follow-Up: _____] : a [unfilled] follow-up visit

## 2022-11-15 NOTE — ASSESSMENT
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 Slow transit motility with +IMO\par History of SIBO likely due to longstanding motility issue from constipation and history of food poisoning but no \par - continue diet/lifestyle changes\par - pt would like to continue with natural sibo support, counseled on risks of supplementation\par - LINZESS and titrating up to bristol 4s\par - f/u with nutritionist as needed\par \par 2. Low platelets/increased WBCs \par - continue to monitor platelets, no further intervention at this time \par \par 3. Sleep improving with improved bowel movements\par - sleep specialist f/u\par \par 4. Liver hemangioma \par - MRI 4/2022 shows 2 hemangiomas; normal gb without polyps\par \par 5. Anxiety\par - psych referral\par \par 6. brain fog\par likely related to imo, sleep disorder\par \par Fu after sibo treatment

## 2022-11-15 NOTE — HISTORY OF PRESENT ILLNESS
[Home] : at home, [unfilled] , at the time of the visit. [Medical Office: ([facility])___] : at the medical office located in  [Patient] : the patient [Self] : self [de-identified] : 34M with PMHx anxiety and arthritis in wrists self referred for evaluation of SIBO and constipation, here for follow up \par \par 11/10/22\par went to [place] and was feeling ok although constipated and linzess not working great\par 2wks after coming back ate something???  and got worse for a week- BLOATED PAIN bad brain fog which resolved\par before he even eats feels bloated\par SIBO TEST + FOR METHANE\par \par 8/19/22\par - got b12 injection, takes nutritional yeast daily . he is plant based \par - linzess 72 working well helps brain fog but sometimes gets diarrhea has not done stool diary\par - seeing sleep doc \par \par 4/8/22\par worsening brain fog recently\par not eating anything different\par on senna\par worse if doesn’t sleep well\par bristol 4ish\par sibo test negative\par inc permeatbility negative\par \par 4/28/20 \par - pt requests TELEMEDICINE visit today for constipation \par - eating healthy and has been cooking most meals at home\par - drinking senna tea and has magnesium citrate on hand\par - senna gives him urge to go bathroom - no negative side effects \par - had bad brain fog for 7 days so has been taking Zinc and feels better, however unsure if from Zinc or mushrooms he was eating \par - sleeping somewhat better \par - saw Dr. Burrell regarding low platelets and pt reports he was not concerned \par - patient still unsure regarding diet and certain foods that may be causing to brain fog, appt with Linda from nutrition next month \par \par 2/27/20\par - pt feeling much improved- having improved bowel movements even sometimes loose\par - less brain fog, better sleeping\par - able to expand diet\par - did not see psych or sleep docs\par \par \par 1/23/20\par - feeling more anxious\par - brain fog for about 10 days last week\par - constipation, every day going to the bathroom a little bit then every 3 days will have a bigger BM \par - tried MiraLAX for the past 5 days (after lunch) - subtle difference \par - tried morning smoothie and caused brain fog for 5 hours - wonders if it is because he put yogurt in it \par - never have taken a stronger laxative \par - been skipping breakfast past week and feels somewhat better \par \par 11/27/19\par - pt here to f/u on smart pill test showing prolonged CTT at 78:33\par - feels some improvement as he is doing daily yoga in AM, sleeping better\par - went to see Dr. WEBER for workup- did not have official follow up yet\par - NEGative for SIBO\par \par 10/11/19\par - brain fog, not sleeping well, body fatigue \par - little bits of stool daily for 5 years - sometimes takes magnesium citrate at night \par - never had EGD/colonoscopy\par - had b12 injection at last visit- did not notice a difference \par - low WBC/PLTs, HLA DQ2 positive \par - currently taking digest cold, triphala, oregano oil, vitamin D, Atrantil \par - in the past took alimed, berberine, motilpro\par \par \par Previous history:\par Has had constipation for at least 5 years and was experiencing brain fog \par Diagnosed SIBO in March 2019- TREATED rifaximin, did not want neomycin because of tinnitus - did not help symptoms \par tries to do low fodmap - has slight bloating \par denies gas, stomach pain, blood in stool \par can't pinpoint foods that bother him, stopping caffeine helped a little bit, he has done a food diary\par lost 10 pounds in last year- changed diet (gluten free, dairy-free), chicken, fish, turkey, lamb, grass- fed red meat \par \par Now- brain fog with sleep disruption (waking up after 4-6 hours), constipation- everyday little bits  (does not get much of an urge) , palpitations \par \par : between 6 mo to a year \par delivery: vaginal\par PPI: none\par Infections: appendicitis in 2016 then appendectomy \par antibiotics: strep throat last year took antibiotic course\par motility: constipation \par 2 weeks after appendectomy went to [place] and got horrible food poisoning \par \par Fhx: father had bladder cancer (dx 45), brother has thyroid cancer (dx 37) \par Etoh: rarely \par Smoking: none\par Drug use: none\par was taking digestive enzymes \par \par blood work at previous GI showed platelets to be 133, Free T3 1.39, Vitamin B12 404, SIBO methane positive, otherwise normal labs

## 2023-01-03 ENCOUNTER — APPOINTMENT (OUTPATIENT)
Dept: GASTROENTEROLOGY | Facility: CLINIC | Age: 38
End: 2023-01-03
Payer: COMMERCIAL

## 2023-01-03 DIAGNOSIS — G47.9 SLEEP DISORDER, UNSPECIFIED: ICD-10-CM

## 2023-01-03 DIAGNOSIS — K63.89 OTHER SPECIFIED DISEASES OF INTESTINE: ICD-10-CM

## 2023-01-03 PROCEDURE — 99214 OFFICE O/P EST MOD 30 MIN: CPT | Mod: 95

## 2023-01-03 RX ORDER — FINASTERIDE 1 MG/1
1 TABLET ORAL
Qty: 30 | Refills: 0 | Status: ACTIVE | COMMUNITY
Start: 2022-12-28

## 2023-01-03 NOTE — HISTORY OF PRESENT ILLNESS
[Home] : at home, [unfilled] , at the time of the visit. 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 took oil of oregano, allemed and atrantil for ~ 6 weeks \par - some constipation and been getting less sleep but less brain fog which he finds strange\par - feeling bloated and "puffy"\par - tries to run/walk 3x per week \par - last meal is usually 5pm then does not drink or eat anything even water afterwards, avoids TV/computer before bedtime \par - taking Linzess 72mcg and thinks yesterday did help a bit more than when was taking supplements \par - has seen nutritionist in the past, does not think needs now\par - has seen sleep specialist too who told him needs to relax, trouble is not falling asleep but it is staying asleep \par \par 11/10/22\par went to [place] and was feeling ok although constipated and linzess not working great\par 2wks after coming back ate something???  and got worse for a week- BLOATED PAIN bad brain fog which resolved\par before he even eats feels bloated\par SIBO TEST + FOR METHANE\par \par 8/19/22\par - got b12 injection, takes nutritional yeast daily . he is plant based \par - linzess 72 working well helps brain fog but sometimes gets diarrhea has not done stool diary\par - seeing sleep doc \par \par 4/8/22\par worsening brain fog recently\par not eating anything different\par on senna\par worse if doesn’t sleep well\par bristol 4ish\par sibo test negative\par inc permeatbility negative\par \par 4/28/20 \par - pt requests TELEMEDICINE visit today for constipation \par - eating healthy and has been cooking most meals at home\par - drinking senna tea and has magnesium citrate on hand\par - senna gives him urge to go bathroom - no negative side effects \par - had bad brain fog for 7 days so has been taking Zinc and feels better, however unsure if from Zinc or mushrooms he was eating \par - sleeping somewhat better \par - saw Dr. Burrell regarding low platelets and pt reports he was not concerned \par - patient still unsure regarding diet and certain foods that may be causing to brain fog, appt with Linda from nutrition next month \par \par 2/27/20\par - pt feeling much improved- having improved bowel movements even sometimes loose\par - less brain fog, better sleeping\par - able to expand diet\par - did not see psych or sleep docs\par \par \par 1/23/20\par - feeling more anxious\par - brain fog for about 10 days last week\par - constipation, every day going to the bathroom a little bit then every 3 days will have a bigger BM \par - tried MiraLAX for the past 5 days (after lunch) - subtle difference \par - tried morning smoothie and caused brain fog for 5 hours - wonders if it is because he put yogurt in it \par - never have taken a stronger laxative \par - been skipping breakfast past week and feels somewhat better \par \par 11/27/19\par - pt here to f/u on smart pill test showing prolonged CTT at 78:33\par - feels some improvement as he is doing daily yoga in AM, sleeping better\par - went to see Dr. WEBER for workup- did not have official follow up yet\par - NEGative for SIBO\par \par 10/11/19\par - brain fog, not sleeping well, body fatigue \par - little bits of stool daily for 5 years - sometimes takes magnesium citrate at night \par - never had EGD/colonoscopy\par - had b12 injection at last visit- did not notice a difference \par - low WBC/PLTs, HLA DQ2 positive \par - currently taking digest cold, triphala, oregano oil, vitamin D, Atrantil \par - in the past took alimed, berberine, motilpro\par \par \par Previous history:\par Has had constipation for at least 5 years and was experiencing brain fog \par Diagnosed SIBO in March 2019- TREATED rifaximin, did not want neomycin because of tinnitus - did not help symptoms \par tries to do low fodmap - has slight bloating \par denies gas, stomach pain, blood in stool \par can't pinpoint foods that bother him, stopping caffeine helped a little bit, he has done a food diary\par lost 10 pounds in last year- changed diet (gluten free, dairy-free), chicken, fish, turkey, lamb, grass- fed red meat \par \par Now- brain fog with sleep disruption (waking up after 4-6 hours), constipation- everyday little bits  (does not get much of an urge) , palpitations \par \par : between 6 mo to a year \par delivery: vaginal\par PPI: none\par Infections: appendicitis in 2016 then appendectomy \par antibiotics: strep throat last year took antibiotic course\par motility: constipation \par 2 weeks after appendectomy went to [place] and got horrible food poisoning \par \par Fhx: father had bladder cancer (dx 45), brother has thyroid cancer (dx 37) \par Etoh: rarely \par Smoking: none\par Drug use: none\par was taking digestive enzymes \par \par blood work at previous GI showed platelets to be 133, Free T3 1.39, Vitamin B12 404, SIBO methane positive, otherwise normal labs

## 2023-01-03 NOTE — REASON FOR VISIT
[Follow-up] : a follow-up of an existing diagnosis [Home] : at home, [unfilled] , at the time of the visit. [Medical Office: ([facility])___] : at the medical office located in  [Patient] : the patient [Self] : self [Follow-Up: _____] : a [unfilled] follow-up visit

## 2023-01-03 NOTE — ASSESSMENT
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 self referred for evaluation of SIBO and constipation, here for follow up after just finishing supplements to treat SIBO \par \par Slow transit motility with +IMO\par History of SIBO likely due to longstanding motility issue from constipation and history of food poisoning but no \par -completed supplements yesterday- explained to patient to give symptoms a little more time to see if improvement occurs, regroup in 2 weeks \par - increase Linzess to 145mcg to increase motility \par - continue healthy diet and lifestyle techniques \par \par Sleep improving with improved bowel movements\par - discussed sleep habits at length including no electronic before bedtime, early bedtime, cup of chamomile tea few hours before to increase relaxation, exercise during the day, no late naps, etc. \par \par Brain fog\par likely related to imo, sleep disorder. improved \par \par Fu 2 weeks via portal

## 2023-01-04 ENCOUNTER — TRANSCRIPTION ENCOUNTER (OUTPATIENT)
Age: 38
End: 2023-01-04

## 2023-01-12 ENCOUNTER — TRANSCRIPTION ENCOUNTER (OUTPATIENT)
Age: 38
End: 2023-01-12

## 2023-02-08 ENCOUNTER — APPOINTMENT (OUTPATIENT)
Dept: GASTROENTEROLOGY | Facility: CLINIC | Age: 38
End: 2023-02-08
Payer: COMMERCIAL

## 2023-02-08 DIAGNOSIS — K58.1 IRRITABLE BOWEL SYNDROME WITH CONSTIPATION: ICD-10-CM

## 2023-02-08 PROCEDURE — 99215 OFFICE O/P EST HI 40 MIN: CPT | Mod: 95

## 2023-02-08 RX ORDER — TENAPANOR HYDROCHLORIDE 53.2 MG/1
50 TABLET ORAL
Qty: 60 | Refills: 3 | Status: ACTIVE | COMMUNITY
Start: 2023-02-08 | End: 1900-01-01

## 2023-02-08 NOTE — HISTORY OF PRESENT ILLNESS
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 took oil of oregano, allemed and atrantil for ~ 6 weeks \par - some constipation and been getting less sleep but less brain fog which he finds strange\par - feeling bloated and "puffy"\par - tries to run/walk 3x per week \par - last meal is usually 5pm then does not drink or eat anything even water afterwards, avoids TV/computer before bedtime \par - taking Linzess 72mcg and thinks yesterday did help a bit more than when was taking supplements \par - has seen nutritionist in the past, does not think needs now\par - has seen sleep specialist too who told him needs to relax, trouble is not falling asleep but it is staying asleep \par \par 11/10/22\par went to [place] and was feeling ok although constipated and linzess not working great\par 2wks after coming back ate something???  and got worse for a week- BLOATED PAIN bad brain fog which resolved\par before he even eats feels bloated\par SIBO TEST + FOR METHANE\par \par 8/19/22\par - got b12 injection, takes nutritional yeast daily . he is plant based \par - linzess 72 working well helps brain fog but sometimes gets diarrhea has not done stool diary\par - seeing sleep doc \par \par 4/8/22\par worsening brain fog recently\par not eating anything different\par on senna\par worse if doesn’t sleep well\par bristol 4ish\par sibo test negative\par inc permeatbility negative\par \par 4/28/20 \par - pt requests TELEMEDICINE visit today for constipation \par - eating healthy and has been cooking most meals at home\par - drinking senna tea and has magnesium citrate on hand\par - senna gives him urge to go bathroom - no negative side effects \par - had bad brain fog for 7 days so has been taking Zinc and feels better, however unsure if from Zinc or mushrooms he was eating \par - sleeping somewhat better \par - saw Dr. Burrell regarding low platelets and pt reports he was not concerned \par - patient still unsure regarding diet and certain foods that may be causing to brain fog, appt with Linda from nutrition next month \par \par 2/27/20\par - pt feeling much improved- having improved bowel movements even sometimes loose\par - less brain fog, better sleeping\par - able to expand diet\par - did not see psych or sleep docs\par \par \par 1/23/20\par - feeling more anxious\par - brain fog for about 10 days last week\par - constipation, every day going to the bathroom a little bit then every 3 days will have a bigger BM \par - tried MiraLAX for the past 5 days (after lunch) - subtle difference \par - tried morning smoothie and caused brain fog for 5 hours - wonders if it is because he put yogurt in it \par - never have taken a stronger laxative \par - been skipping breakfast past week and feels somewhat better \par \par 11/27/19\par - pt here to f/u on smart pill test showing prolonged CTT at 78:33\par - feels some improvement as he is doing daily yoga in AM, sleeping better\par - went to see Dr. WEBER for workup- did not have official follow up yet\par - NEGative for SIBO\par \par 10/11/19\par - brain fog, not sleeping well, body fatigue \par - little bits of stool daily for 5 years - sometimes takes magnesium citrate at night \par - never had EGD/colonoscopy\par - had b12 injection at last visit- did not notice a difference \par - low WBC/PLTs, HLA DQ2 positive \par - currently taking digest cold, triphala, oregano oil, vitamin D, Atrantil \par - in the past took alimed, berberine, motilpro\par \par \par Previous history:\par Has had constipation for at least 5 years and was experiencing brain fog \par Diagnosed SIBO in March 2019- TREATED rifaximin, did not want neomycin because of tinnitus - did not help symptoms \par tries to do low fodmap - has slight bloating \par denies gas, stomach pain, blood in stool \par can't pinpoint foods that bother him, stopping caffeine helped a little bit, he has done a food diary\par lost 10 pounds in last year- changed diet (gluten free, dairy-free), chicken, fish, turkey, lamb, grass- fed red meat \par \par Now- brain fog with sleep disruption (waking up after 4-6 hours), constipation- everyday little bits  (does not get much of an urge) , palpitations \par \par : between 6 mo to a year \par delivery: vaginal\par PPI: none\par Infections: appendicitis in 2016 then appendectomy \par antibiotics: strep throat last year took antibiotic course\par motility: constipation \par 2 weeks after appendectomy went to [place] and got horrible food poisoning \par \par Fhx: father had bladder cancer (dx 45), brother has thyroid cancer (dx 37) \par Etoh: rarely \par Smoking: none\par Drug use: none\par was taking digestive enzymes \par \par blood work at previous GI showed platelets to be 133, Free T3 1.39, Vitamin B12 404, SIBO methane positive, otherwise normal labs

## 2023-02-08 NOTE — REASON FOR VISIT
[Follow-up] : a follow-up of an existing diagnosis [Home] : at home, [unfilled] , at the time of the visit. [Medical Office: ([facility])___] : at the medical office located in  [Patient] : the patient [Self] : self

## 2023-02-08 NOTE — ASSESSMENT
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 \par \par Brain fog\par likely related to imo, sleep disorder. improved \par \par Fu 6-8wks

## 2023-02-17 ENCOUNTER — TRANSCRIPTION ENCOUNTER (OUTPATIENT)
Age: 38
End: 2023-02-17

## 2023-02-28 ENCOUNTER — MED ADMIN CHARGE (OUTPATIENT)
Age: 38
End: 2023-02-28

## 2023-02-28 ENCOUNTER — APPOINTMENT (OUTPATIENT)
Dept: GASTROENTEROLOGY | Facility: CLINIC | Age: 38
End: 2023-02-28
Payer: COMMERCIAL

## 2023-02-28 PROCEDURE — 96372 THER/PROPH/DIAG INJ SC/IM: CPT

## 2023-02-28 RX ORDER — CYANOCOBALAMIN 1000 UG/ML
1000 INJECTION INTRAMUSCULAR; SUBCUTANEOUS
Qty: 0 | Refills: 0 | Status: COMPLETED | OUTPATIENT
Start: 2023-02-28

## 2023-02-28 RX ORDER — CYANOCOBALAMIN 1000 UG/ML
1000 INJECTION INTRAMUSCULAR; SUBCUTANEOUS
Qty: 0 | Refills: 0 | Status: COMPLETED | OUTPATIENT
Start: 2022-08-17

## 2023-05-10 ENCOUNTER — APPOINTMENT (OUTPATIENT)
Dept: UROLOGY | Facility: CLINIC | Age: 38
End: 2023-05-10
Payer: COMMERCIAL

## 2023-05-10 VITALS
RESPIRATION RATE: 14 BRPM | BODY MASS INDEX: 18.82 KG/M2 | OXYGEN SATURATION: 98 % | WEIGHT: 142 LBS | DIASTOLIC BLOOD PRESSURE: 71 MMHG | HEART RATE: 79 BPM | HEIGHT: 73 IN | SYSTOLIC BLOOD PRESSURE: 120 MMHG

## 2023-05-10 DIAGNOSIS — R36.1 HEMATOSPERMIA: ICD-10-CM

## 2023-05-10 DIAGNOSIS — B00.9 HERPESVIRAL INFECTION, UNSPECIFIED: ICD-10-CM

## 2023-05-10 PROCEDURE — 99204 OFFICE O/P NEW MOD 45 MIN: CPT

## 2023-05-10 NOTE — HISTORY OF PRESENT ILLNESS
[FreeTextEntry1] : Language: English\par Date of First visit: 05/10/2023 \par Accompanied by: self\par Contact info: \par Referring Provider/PCP: Dr. Duque\par Fax: \par \par CC/ Problem List:\par hematospermia\par HSV-2\par \par ===============================================================================\par FIRST VISIT / Summary:\par Very pleasant 37 year old M here for HSV2 (asymptomatic, history of infection) and hematospermia. Has had more than once over last 2-3 weeks. Only has happened since STI testing. Has had some relations since last STI testing. Never had urine tested for bacteria.\par \par Urethral tingling - hx of discoloration that's now there but wasn't before. Feeling it now again for about a month.\par \par They denied risk factors except as noted above including but not limited to: chemicals including dye, petroleum derivatives, chemotherapy, radiation, foreign objects (stents, catheters, stones, etc), or infections (TB, schistosomiasis, etc). \par \par -------------------------------------------------------------------------------------------\par INTERVAL VISITS:\par \par ===============================================================================\par \par PMH: none\par Meds: linzess, finasteride\par All: nkda\par FHx: father bladder cancer\par SocHx: never smoker, no etoh\par \par PSH: appy 2016\par \par \par ROS: Review of Systems is as per HPI unless otherwise denoted below\par \par \par ===============================================================================\par DATA: \par \par LABS (SELECTED):---------------------------------------------------------------------------------------------------\par STI testing: HSV positive, not confirmed, G/C/trich all negative. No urine cx\par \par RADS:-------------------------------------------------------------------------------------------------------------------\par \par \par PATHOLOGY/CYTOLOGY:-------------------------------------------------------------------------------------------\par \par \par VOIDING STUDIES: ----------------------------------------------------------------------------------------------------\par \par \par STONE STUDIES: (Analysis/[facility])----------------------------------------------------------------------------------\par \par \par PROCEDURES: -----------------------------------------------------------------------------------------------\par \par \par \par ===============================================================================\par \par PHYSICAL EXAM:\par \par GEN: AAOx3, NAD\par \par PSYCH: Appropriate Behavior, Affect Congruent\par \par Lungs: No labored breathing\par \par GAIT: Gait normal, Stability good\par \par ABD: no suprapubic or CVAT\par \par \par  FOCUSED: --------------(done xx/xx/xxxx)------------------------------------------------------------------------\par \par declined\par =======================================================================================\par DISCUSSION: \par =======================================================================================\par ASSESSMENT and PLAN\par \par \par 1. STI testing\par - repeat given new symptoms and findings, including UA/Cx\par - sent valacyclovir if he wants to start\par - would retest including confirmation prior to taking medication\par \par 2. Hematospermia\par - low risk, monitor, if recurrs consider cystoscopy\par \par \par =======================================================================================\par \par Thank you for allowing me to assist in the care of your patient. Should you have any questions please do not hesitate to reach out to me.\par \par \par Mendez Shoemaker MD\par [facility]\par [facility] [place]\par [address], [address]\[place] [address]\par T: [phone]\par F: [phone]

## 2023-05-11 LAB
APPEARANCE: CLEAR
BACTERIA: NEGATIVE /HPF
BILIRUBIN URINE: NEGATIVE
BLOOD URINE: NEGATIVE
C TRACH RRNA SPEC QL NAA+PROBE: NOT DETECTED
CAST: 0 /LPF
COLOR: YELLOW
EPITHELIAL CELLS: 0 /HPF
GLUCOSE QUALITATIVE U: NEGATIVE MG/DL
KETONES URINE: NEGATIVE MG/DL
LEUKOCYTE ESTERASE URINE: NEGATIVE
MICROSCOPIC-UA: NORMAL
N GONORRHOEA RRNA SPEC QL NAA+PROBE: NOT DETECTED
NITRITE URINE: NEGATIVE
PH URINE: 6
PROTEIN URINE: NEGATIVE MG/DL
RED BLOOD CELLS URINE: 0 /HPF
SOURCE AMPLIFICATION: NORMAL
SPECIFIC GRAVITY URINE: 1.01
UROBILINOGEN URINE: 0.2 MG/DL
WHITE BLOOD CELLS URINE: 0 /HPF

## 2023-05-14 LAB — BACTERIA UR CULT: NORMAL

## 2023-05-22 ENCOUNTER — APPOINTMENT (OUTPATIENT)
Dept: UROLOGY | Facility: CLINIC | Age: 38
End: 2023-05-22
Payer: COMMERCIAL

## 2023-05-22 VITALS
DIASTOLIC BLOOD PRESSURE: 71 MMHG | OXYGEN SATURATION: 96 % | HEART RATE: 86 BPM | TEMPERATURE: 98 F | SYSTOLIC BLOOD PRESSURE: 104 MMHG

## 2023-05-22 LAB
BILIRUB UR QL STRIP: NORMAL
CLARITY UR: CLEAR
COLLECTION METHOD: NORMAL
GLUCOSE UR-MCNC: NORMAL
HCG UR QL: 0.2 EU/DL
HGB UR QL STRIP.AUTO: NORMAL
KETONES UR-MCNC: NORMAL
LEUKOCYTE ESTERASE UR QL STRIP: NORMAL
NITRITE UR QL STRIP: NORMAL
PH UR STRIP: 7.5
PROT UR STRIP-MCNC: NORMAL
SP GR UR STRIP: 1.01

## 2023-05-22 PROCEDURE — 99214 OFFICE O/P EST MOD 30 MIN: CPT | Mod: 25

## 2023-05-22 PROCEDURE — 81003 URINALYSIS AUTO W/O SCOPE: CPT | Mod: QW

## 2023-05-22 NOTE — HISTORY OF PRESENT ILLNESS
[FreeTextEntry1] : Language: English\par Date of First visit: 05/10/2023 \par Accompanied by: self\par Contact info: \par Referring Provider/PCP: Dr. Duque\par Fax: \par \par CC/ Problem List:\par hematospermia\par HSV-2\par \par ===============================================================================\par FIRST VISIT / Summary:\par The patient was a 37 year old M who first presented to see Dr. Shoemaker on 5/10/2023 for HSV2 (asymptomatic, history of infection) and hematospermia. He had full workup in March 2023 because he had a new sexual partner. \par \par His hematospermia started around the end of April and he has had more than once over last 2-3 weeks. \par \par Urethral tingling - hx of discoloration that's now there but wasn't before. Feeling it now again for about a month.\par \par They denied risk factors except as noted above including but not limited to: chemicals including dye, petroleum derivatives, chemotherapy, radiation, foreign objects (stents, catheters, stones, etc), or infections (TB, schistosomiasis, etc). \par \par -------------------------------------------------------------------------------------------\par INTERVAL VISITS:\par \par At his first visit Dr. Shoemaker sent urine and GC/CT which were negative. \par \par The patient's age today 05/22/2023 is 37 year old.\par Please note interval events and changes in PMH, PSH, MEDS and ALLERGIES were reviewed.\par He thinks his hematospermia is almost gone now. He denies dysuria and penile discharge.\par \par He is still bothered by his new HSV-2 diagnosis. His last partner had cold sores and assumed she had HSV-1.\par he has concern about his new partner and HSV. His partner knows he has HSV-2. He did have urethral pain once around age 23yo for 10 days. He has never had a visible outbreak. He has never had cold sores. \par \par ===============================================================================\par \par PMH: SIBO\par Meds: linzess, finasteride, Vit D, B12, Allicin\par All: nkda\par FHx: father bladder cancer\par SocHx: never smoker, no etoh\par \par PSH: appy 2016\par \par \par ROS: Review of Systems is as per HPI unless otherwise denoted below\par \par \par ===============================================================================\par DATA: \[facility] \[facility] LABS (SELECTED):---------------------------------------------------------------------------------------------------\[facility] 3/31/2023: STI testing: HSV-1 IgG negative, HSV-2 IgG positive, Trichomonas negative, syphilis, HIV, Hep c all negative; Trich RNA negative , GC/CT negative\par \par 5/10/2023: UA micro negative 0 WBC, RBC; neg NIT/LE; culture negative\par GC/CT negative\par \par \par RADS:-------------------------------------------------------------------------------------------------------------------\par \par \par PATHOLOGY/CYTOLOGY:-------------------------------------------------------------------------------------------\par \par \par VOIDING STUDIES: ----------------------------------------------------------------------------------------------------\par \par \par STONE STUDIES: (Analysis/LLSA)----------------------------------------------------------------------------------\par \par \par PROCEDURES: -----------------------------------------------------------------------------------------------\par \par \par \par ===============================================================================\par \par PHYSICAL EXAM:\par \par GEN: AAOx3, NAD\par \par PSYCH: Appropriate Behavior, Affect Congruent\par \par Lungs: No labored breathing\par \par GAIT: Gait normal, Stability good\par \par ABD: no suprapubic or CVAT\par \par \par =======================================================================================\par ASSESSMENT and PLAN\par \par \par 1. HSV-2\par The patient and I discussed HSV as follows:\par  I told the patient that there are two types of HSV (types 1, 2). I explained that HSV can be contracted on multiple parts of the body including the genitals, the lips, the finger (saeid), and the eye. Contrary to common belief, Type 1 and type 2 do not "belong" to any one part of the body and now at least 20% of cases of genital HSV are type 1 and vice versa. I explained that while the first outbreak of HSV is often the worst, this is not always true and up to 40% of people with HSV do not know that they have it. Additionally it is very hard to tell when a person contracts an infection based on symptoms or lesions. I explained that the only way to tell if someone was recently (and newly) infected is to check an IgM antibody but even this has error.  \par \par PREVENTION: I explained that safe sex is certainly indicated during times of outbreaks and immediately prior (when patients often get a prodrome of tingling or itching). However I also explained the phenomena of asymptomatic shedding which renders a person "contagious" even when they do not have symptoms. As such honesty and protection are the best policy.  \par \par CROSS REACTIVITY: Although having an HSV infection is somewhat protective against a second infection with another strain, it is not entirely protective. Additionally I stressed the importance of good hand hygiene to prevent transmission to the fingers or eyes.  \par \par PROPHYLAXIS: I explained that we can Rx prophylaxis if recurrences become too bothersome or frequent. It can also reduce asymptomatic shedding. Suppression dosing is Valtrex 1000mg po QD, or 500mg po QD if <10 outbreaks/year); Acyclovir 400mg po BID \par \par DIAGNOSIS: I also explained that without culture of the lesion, blood serology can not tell me the location of the lesion. Recent infections also may not have seroconverted yet.  \par \par He is going to consider whether he wants to take prophylaxis. He had many questions about HSV that I answered. \par \par \par 2. Hematospermia\par this seems to be resolving.\par \par \par =======================================================================================\par \par The total amount of time I have personally spent preparing for this visit, reviewing the patient's test results, obtaining external history, ordering tests/medications, documenting clinical information, communicating with and counseling the patient/family and/or caregiver(s), and spent face to face with the patient explaining the above was  35 minutes.\par \par Thank you for allowing me to assist in the care of your patient. Should you have any questions please do not hesitate to reach out to me.\par \par \par Karen To MD\par Associate \par Department of Urology\par [facility]\par Phone: [phone]\par Fax: [phone]\par \par [address] [place] [address]\par [place] [address]\par

## 2023-05-25 ENCOUNTER — APPOINTMENT (OUTPATIENT)
Dept: GASTROENTEROLOGY | Facility: CLINIC | Age: 38
End: 2023-05-25
Payer: COMMERCIAL

## 2023-05-25 DIAGNOSIS — K59.09 OTHER CONSTIPATION: ICD-10-CM

## 2023-05-25 PROCEDURE — 99214 OFFICE O/P EST MOD 30 MIN: CPT | Mod: 95

## 2023-05-25 RX ORDER — UBIDECARENONE/VIT E ACET 100MG-5
CAPSULE ORAL
Refills: 0 | Status: ACTIVE | COMMUNITY

## 2023-05-25 NOTE — REASON FOR VISIT
[Follow-up] : a follow-up of an existing diagnosis [Home] : at home, [unfilled] , at the time of the visit. [Medical Office: ([facility])___] : at the medical office located in  [Patient] : the patient [Self] : self

## 2023-06-14 NOTE — HISTORY OF PRESENT ILLNESS
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 self referred for evaluation of SIBO and constipation, here for follow up after just finishing supplements to treat SIBO \par \par 5/25/23\par - has IBSRELA but has not tried it yet \par - Linzess does work but often times does have diarrhea\par - brain fog really depends on sleep \par - weight: 142 lbs, stable \par - past few weeks sleep a bit better \par \par 2/8/23\par on linzess 145 and it causes diarrhea but brain fog improved\par didn’t sleep well on antimicrobials but clear headed\par not sure if he still has sibo yet has had some improvement but not completely\par \par 1/3/22\par - just finished yesterday supplements for SIBO. took oil of oregano, allemed and atrantil for ~ 6 weeks \par - some constipation and been getting less sleep but less brain fog which he finds strange\par - feeling bloated and "puffy"\par - tries to run/walk 3x per week \par - last meal is usually 5pm then does not drink or eat anything even water afterwards, avoids TV/computer before bedtime \par - taking Linzess 72mcg and thinks yesterday did help a bit more than when was taking supplements \par - has seen nutritionist in the past, does not think needs now\par - has seen sleep specialist too who told him needs to relax, trouble is not falling asleep but it is staying asleep \par \par 11/10/22\par went to [place] and was feeling ok although constipated and linzess not working great\par 2wks after coming back ate something???  and got worse for a week- BLOATED PAIN bad brain fog which resolved\par before he even eats feels bloated\par SIBO TEST + FOR METHANE\par \par 8/19/22\par - got b12 injection, takes nutritional yeast daily . he is plant based \par - linzess 72 working well helps brain fog but sometimes gets diarrhea has not done stool diary\par - seeing sleep doc \par \par 4/8/22\par worsening brain fog recently\par not eating anything different\par on senna\par worse if doesn’t sleep well\par bristol 4ish\par sibo test negative\par inc permeatbility negative\par \par 4/28/20 \par - pt requests TELEMEDICINE visit today for constipation \par - eating healthy and has been cooking most meals at home\par - drinking senna tea and has magnesium citrate on hand\par - senna gives him urge to go bathroom - no negative side effects \par - had bad brain fog for 7 days so has been taking Zinc and feels better, however unsure if from Zinc or mushrooms he was eating \par - sleeping somewhat better \par - saw Dr. Burrell regarding low platelets and pt reports he was not concerned \par - patient still unsure regarding diet and certain foods that may be causing to brain fog, appt with Linda from nutrition next month \par \par 2/27/20\par - pt feeling much improved- having improved bowel movements even sometimes loose\par - less brain fog, better sleeping\par - able to expand diet\par - did not see psych or sleep docs\par \par \par 1/23/20\par - feeling more anxious\par - brain fog for about 10 days last week\par - constipation, every day going to the bathroom a little bit then every 3 days will have a bigger BM \par - tried MiraLAX for the past 5 days (after lunch) - subtle difference \par - tried morning smoothie and caused brain fog for 5 hours - wonders if it is because he put yogurt in it \par - never have taken a stronger laxative \par - been skipping breakfast past week and feels somewhat better \par \par 11/27/19\par - pt here to f/u on smart pill test showing prolonged CTT at 78:33\par - feels some improvement as he is doing daily yoga in AM, sleeping better\par - went to see Dr. WEBER for workup- did not have official follow up yet\par - NEGative for SIBO\par \par 10/11/19\par - brain fog, not sleeping well, body fatigue \par - little bits of stool daily for 5 years - sometimes takes magnesium citrate at night \par - never had EGD/colonoscopy\par - had b12 injection at last visit- did not notice a difference \par - low WBC/PLTs, HLA DQ2 positive \par - currently taking digest cold, triphala, oregano oil, vitamin D, Atrantil \par - in the past took alimed, berberine, motilpro\par \par \par Previous history:\par Has had constipation for at least 5 years and was experiencing brain fog \par Diagnosed SIBO in March 2019- TREATED rifaximin, did not want neomycin because of tinnitus - did not help symptoms \par tries to do low fodmap - has slight bloating \par denies gas, stomach pain, blood in stool \par can't pinpoint foods that bother him, stopping caffeine helped a little bit, he has done a food diary\par lost 10 pounds in last year- changed diet (gluten free, dairy-free), chicken, fish, turkey, lamb, grass- fed red meat \par \par Now- brain fog with sleep disruption (waking up after 4-6 hours), constipation- everyday little bits  (does not get much of an urge) , palpitations \par \par : between 6 mo to a year \par delivery: vaginal\par PPI: none\par Infections: appendicitis in 2016 then appendectomy \par antibiotics: strep throat last year took antibiotic course\par motility: constipation \par 2 weeks after appendectomy went to [place] and got horrible food poisoning \par \par Fhx: father had bladder cancer (dx 45), brother has thyroid cancer (dx 37) \par Etoh: rarely \par Smoking: none\par Drug use: none\par was taking digestive enzymes \par \par blood work at previous GI showed platelets to be 133, Free T3 1.39, Vitamin B12 404, SIBO methane positive, otherwise normal labs

## 2023-06-14 NOTE — ASSESSMENT
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 \par \par Brain fog\par likely related to imo, sleep disorder. improved \par \par COLONOSCOPY if doesn’t improve or for screening at 45 unless cinically indicated earlier\par \par Fu3mo - and pt understands i am leaving practice in june therefore to f/u with me in new practice or with [facility] colleague and to get f/u appt asap so gets seen in a timely fashion, pt understands so does not get lost to follow up\par

## 2023-09-26 ENCOUNTER — TRANSCRIPTION ENCOUNTER (OUTPATIENT)
Age: 38
End: 2023-09-26

## 2024-01-02 ENCOUNTER — TRANSCRIPTION ENCOUNTER (OUTPATIENT)
Age: 39
End: 2024-01-02

## 2024-01-02 RX ORDER — LINACLOTIDE 72 UG/1
72 CAPSULE, GELATIN COATED ORAL
Qty: 90 | Refills: 2 | Status: ACTIVE | COMMUNITY
Start: 2022-04-08 | End: 1900-01-01

## 2024-04-09 ENCOUNTER — APPOINTMENT (OUTPATIENT)
Dept: UROLOGY | Facility: CLINIC | Age: 39
End: 2024-04-09

## 2024-05-10 ENCOUNTER — APPOINTMENT (OUTPATIENT)
Dept: UROLOGY | Facility: CLINIC | Age: 39
End: 2024-05-10
Payer: COMMERCIAL

## 2024-05-10 VITALS
RESPIRATION RATE: 16 BRPM | HEART RATE: 99 BPM | TEMPERATURE: 97 F | OXYGEN SATURATION: 98 % | BODY MASS INDEX: 18.82 KG/M2 | WEIGHT: 142 LBS | SYSTOLIC BLOOD PRESSURE: 119 MMHG | DIASTOLIC BLOOD PRESSURE: 86 MMHG | HEIGHT: 73 IN

## 2024-05-10 DIAGNOSIS — Z11.3 ENCOUNTER FOR SCREENING FOR INFECTIONS WITH A PREDOMINANTLY SEXUAL MODE OF TRANSMISSION: ICD-10-CM

## 2024-05-10 PROCEDURE — 99214 OFFICE O/P EST MOD 30 MIN: CPT

## 2024-05-13 NOTE — HISTORY OF PRESENT ILLNESS
[FreeTextEntry1] :  KATARINA CONDON Sep 30 1985   Language: English Date of First visit: 05/10/2023 Accompanied by: self Contact info:  Referring Provider/PCP: Dr. Duque Fax:   CC/ Problem List: hematospermia HSV-2  ============================================================================== FIRST VISIT / Summary:  Very pleasant 37 year old M here for HSV2 (asymptomatic, history of infection) and hematospermia. Has had more than once over last 2-3 weeks. Only has happened since STI testing. Has had some relations since last STI testing. Never had urine tested for bacteria.  Urethral tingling - hx of discoloration that's now there but wasn't before. Feeling it now again for about a month.  They denied risk factors except as noted above including but not limited to: chemicals including dye, petroleum derivatives, chemotherapy, radiation, foreign objects (stents, catheters, stones, etc), or infections (TB, schistosomiasis, etc).  ------------------------------------------------------------------------------------------- INTERVAL VISITS: The patient's medications and allergies were reviewed and edited below. Dated 05/10/2024   He saw Dr. To since my last visit with him.   ===============================================================================  PMH: none Meds: linzess 145mg, finasteride All: nkda FHx: father bladder cancer SocHx: never smoker, no etoh  PSH: appy 2016  ROS: Review of Systems is as per HPI unless otherwise denoted below   =============================================================================== DATA: LABS (SELECTED):--------------------------------------------------------------------------------------------------- STI testing: HSV positive, not confirmed, G/C/trich all negative. No urine cx 5/10/23: G/C negative  RADS:-------------------------------------------------------------------------------------------------------------------   PATHOLOGY/CYTOLOGY:-------------------------------------------------------------------------------------------   VOIDING STUDIES: ----------------------------------------------------------------------------------------------------   STONE STUDIES: (Analysis/LLSA)----------------------------------------------------------------------------------   PROCEDURES: -----------------------------------------------------------------------------------------------    ===============================================================================  PHYSICAL EXAM   FOCUSED: --------------(done xx/xx/xxxx)------------------------------------------------------------------------ declined ======================================================================================= DISCUSSION: We discussed in detail mycoplasma and treatment options including debate as to utility, especially in his case being asymptomatic. Given his partner is being treated he is worried about harboring and reinfecting her.  ======================================================================================= ASSESSMENT and PLAN  1. STI testing - partner with mycoplasma on treatment - urine for mycoplasma - on valacyclovir  2. Hematospermia - low risk, monitor, if recurrs consider cystoscopy  ======================================================================================= The total time personally spent preparing for this visit (reviewing test results, obtaining external history) and during the visit (ordering tests/medications, spent face to face with the patient / family and counseling them on the above), as well as after the visit (on clinical documentation and coordination with other care providers) was approximately 35 minutes.   Thank you for allowing me to assist in the care of your patient. Should you have any questions please do not hesitate to reach out to me.  Mendez Shoemaker MD                                                             [facility] Physician [facility]  [place] Office: [address], [address]    [place] [address]     T: [phone]     F: [phone]      [place] Office: [address], [address] [place] [address] T: [phone] F: [phone]

## 2024-05-15 LAB
M GENITALIUM DNA UR QL NAA+PROBE: NEGATIVE
M HOMINIS DNA SPEC QL NAA+PROBE: NEGATIVE
UREAPLASMA DNA SPEC QL NAA+PROBE: NEGATIVE

## 2024-06-07 RX ORDER — VALACYCLOVIR 500 MG/1
500 TABLET, FILM COATED ORAL DAILY
Qty: 30 | Refills: 11 | Status: ACTIVE | COMMUNITY
Start: 2023-05-10 | End: 1900-01-01

## 2024-10-03 ENCOUNTER — RX RENEWAL (OUTPATIENT)
Age: 39
End: 2024-10-03

## 2025-01-02 ENCOUNTER — TRANSCRIPTION ENCOUNTER (OUTPATIENT)
Age: 40
End: 2025-01-02

## 2025-01-06 ENCOUNTER — TRANSCRIPTION ENCOUNTER (OUTPATIENT)
Age: 40
End: 2025-01-06

## 2025-02-11 ENCOUNTER — TRANSCRIPTION ENCOUNTER (OUTPATIENT)
Age: 40
End: 2025-02-11

## 2025-02-14 ENCOUNTER — TRANSCRIPTION ENCOUNTER (OUTPATIENT)
Age: 40
End: 2025-02-14

## 2025-03-24 ENCOUNTER — APPOINTMENT (OUTPATIENT)
Dept: UROLOGY | Facility: CLINIC | Age: 40
End: 2025-03-24
Payer: COMMERCIAL

## 2025-03-24 ENCOUNTER — NON-APPOINTMENT (OUTPATIENT)
Age: 40
End: 2025-03-24

## 2025-03-24 VITALS
DIASTOLIC BLOOD PRESSURE: 80 MMHG | OXYGEN SATURATION: 99 % | RESPIRATION RATE: 15 BRPM | SYSTOLIC BLOOD PRESSURE: 116 MMHG | WEIGHT: 142 LBS | TEMPERATURE: 97.5 F | BODY MASS INDEX: 18.82 KG/M2 | HEIGHT: 73 IN | HEART RATE: 70 BPM

## 2025-03-24 DIAGNOSIS — N41.9 INFLAMMATORY DISEASE OF PROSTATE, UNSPECIFIED: ICD-10-CM

## 2025-03-24 PROCEDURE — 99214 OFFICE O/P EST MOD 30 MIN: CPT

## 2025-03-25 LAB
APPEARANCE: CLEAR
BACTERIA: NEGATIVE /HPF
BILIRUBIN URINE: NEGATIVE
BLOOD URINE: NEGATIVE
CAST: 0 /LPF
COLOR: YELLOW
EPITHELIAL CELLS: 0 /HPF
GLUCOSE QUALITATIVE U: NEGATIVE MG/DL
KETONES URINE: NEGATIVE MG/DL
LEUKOCYTE ESTERASE URINE: NEGATIVE
MICROSCOPIC-UA: NORMAL
NITRITE URINE: NEGATIVE
PH URINE: 7.5
PROTEIN URINE: NEGATIVE MG/DL
RED BLOOD CELLS URINE: 0 /HPF
SPECIFIC GRAVITY URINE: 1.01
UROBILINOGEN URINE: 0.2 MG/DL
WHITE BLOOD CELLS URINE: 0 /HPF

## 2025-03-26 LAB
BACTERIA UR CULT: NORMAL
N GONORRHOEA RRNA SPEC QL NAA+PROBE: NOT DETECTED
SOURCE AMPLIFICATION: NORMAL

## 2025-04-07 ENCOUNTER — TRANSCRIPTION ENCOUNTER (OUTPATIENT)
Age: 40
End: 2025-04-07

## 2025-04-07 ENCOUNTER — RX RENEWAL (OUTPATIENT)
Age: 40
End: 2025-04-07

## 2025-04-14 ENCOUNTER — APPOINTMENT (OUTPATIENT)
Dept: GASTROENTEROLOGY | Facility: CLINIC | Age: 40
End: 2025-04-14
Payer: COMMERCIAL

## 2025-04-14 VITALS
DIASTOLIC BLOOD PRESSURE: 68 MMHG | HEIGHT: 73 IN | OXYGEN SATURATION: 100 % | HEART RATE: 89 BPM | SYSTOLIC BLOOD PRESSURE: 102 MMHG | BODY MASS INDEX: 19.54 KG/M2 | TEMPERATURE: 97.6 F | WEIGHT: 147.4 LBS | RESPIRATION RATE: 16 BRPM

## 2025-04-14 DIAGNOSIS — Z00.00 ENCOUNTER FOR GENERAL ADULT MEDICAL EXAMINATION W/OUT ABNORMAL FINDINGS: ICD-10-CM

## 2025-04-14 PROCEDURE — 99215 OFFICE O/P EST HI 40 MIN: CPT

## 2025-04-15 LAB
ALBUMIN SERPL ELPH-MCNC: 4.9 G/DL
ALP BLD-CCNC: 75 U/L
ALT SERPL-CCNC: 14 U/L
ANION GAP SERPL CALC-SCNC: 13 MMOL/L
AST SERPL-CCNC: 17 U/L
BILIRUB SERPL-MCNC: 0.6 MG/DL
BUN SERPL-MCNC: 10 MG/DL
CALCIUM SERPL-MCNC: 10.2 MG/DL
CHLORIDE SERPL-SCNC: 103 MMOL/L
CO2 SERPL-SCNC: 28 MMOL/L
CREAT SERPL-MCNC: 0.85 MG/DL
EGFRCR SERPLBLD CKD-EPI 2021: 112 ML/MIN/1.73M2
GLUCOSE SERPL-MCNC: 98 MG/DL
HCT VFR BLD CALC: 47.7 %
HGB BLD-MCNC: 16.2 G/DL
MCHC RBC-ENTMCNC: 30.9 PG
MCHC RBC-ENTMCNC: 34 G/DL
MCV RBC AUTO: 90.9 FL
PLATELET # BLD AUTO: 213 K/UL
POTASSIUM SERPL-SCNC: 4.5 MMOL/L
PROT SERPL-MCNC: 7.2 G/DL
RBC # BLD: 5.25 M/UL
RBC # FLD: 13 %
SODIUM SERPL-SCNC: 144 MMOL/L
WBC # FLD AUTO: 5.83 K/UL

## 2025-08-04 ENCOUNTER — APPOINTMENT (OUTPATIENT)
Dept: ORTHOPEDIC SURGERY | Facility: CLINIC | Age: 40
End: 2025-08-04
Payer: COMMERCIAL

## 2025-08-04 DIAGNOSIS — M62.89 OTHER SPECIFIED DISORDERS OF MUSCLE: ICD-10-CM

## 2025-08-04 PROCEDURE — 72110 X-RAY EXAM L-2 SPINE 4/>VWS: CPT

## 2025-08-04 PROCEDURE — 99205 OFFICE O/P NEW HI 60 MIN: CPT

## 2025-08-06 ENCOUNTER — APPOINTMENT (OUTPATIENT)
Dept: INTERNAL MEDICINE | Facility: CLINIC | Age: 40
End: 2025-08-06
Payer: COMMERCIAL

## 2025-08-06 ENCOUNTER — RESULT REVIEW (OUTPATIENT)
Age: 40
End: 2025-08-06

## 2025-08-06 ENCOUNTER — LABORATORY RESULT (OUTPATIENT)
Age: 40
End: 2025-08-06

## 2025-08-06 VITALS
WEIGHT: 150 LBS | BODY MASS INDEX: 19.88 KG/M2 | HEART RATE: 85 BPM | OXYGEN SATURATION: 99 % | DIASTOLIC BLOOD PRESSURE: 82 MMHG | HEIGHT: 73 IN | SYSTOLIC BLOOD PRESSURE: 123 MMHG

## 2025-08-06 DIAGNOSIS — Z80.7 FAMILY HISTORY OF OTHER MALIGNANT NEOPLASMS OF LYMPHOID, HEMATOPOIETIC AND RELATED TISSUES: ICD-10-CM

## 2025-08-06 DIAGNOSIS — R10.32 LEFT LOWER QUADRANT PAIN: ICD-10-CM

## 2025-08-06 DIAGNOSIS — E53.8 DEFICIENCY OF OTHER SPECIFIED B GROUP VITAMINS: ICD-10-CM

## 2025-08-06 DIAGNOSIS — Z13.220 ENCOUNTER FOR SCREENING FOR LIPOID DISORDERS: ICD-10-CM

## 2025-08-06 DIAGNOSIS — Z87.898 PERSONAL HISTORY OF OTHER SPECIFIED CONDITIONS: ICD-10-CM

## 2025-08-06 DIAGNOSIS — G43.909 MIGRAINE, UNSPECIFIED, NOT INTRACTABLE, W/OUT STATUS MIGRAINOSUS: ICD-10-CM

## 2025-08-06 DIAGNOSIS — Z79.899 OTHER LONG TERM (CURRENT) DRUG THERAPY: ICD-10-CM

## 2025-08-06 DIAGNOSIS — Z80.52 FAMILY HISTORY OF MALIGNANT NEOPLASM OF BLADDER: ICD-10-CM

## 2025-08-06 DIAGNOSIS — K59.04 CHRONIC IDIOPATHIC CONSTIPATION: ICD-10-CM

## 2025-08-06 DIAGNOSIS — D18.03 HEMANGIOMA OF INTRA-ABDOMINAL STRUCTURES: ICD-10-CM

## 2025-08-06 DIAGNOSIS — B00.9 HERPESVIRAL INFECTION, UNSPECIFIED: ICD-10-CM

## 2025-08-06 DIAGNOSIS — Z87.39 PERSONAL HISTORY OF OTHER DISEASES OF THE MUSCULOSKELETAL SYSTEM AND CONNECTIVE TISSUE: ICD-10-CM

## 2025-08-06 PROCEDURE — 36415 COLL VENOUS BLD VENIPUNCTURE: CPT

## 2025-08-06 PROCEDURE — 99386 PREV VISIT NEW AGE 40-64: CPT

## 2025-08-06 RX ORDER — RIMEGEPANT SULFATE 75 MG/75MG
75 TABLET, ORALLY DISINTEGRATING ORAL
Qty: 15 | Refills: 3 | Status: ACTIVE | COMMUNITY
Start: 2024-12-17

## 2025-08-07 ENCOUNTER — TRANSCRIPTION ENCOUNTER (OUTPATIENT)
Age: 40
End: 2025-08-07

## 2025-08-07 ENCOUNTER — APPOINTMENT (OUTPATIENT)
Dept: ULTRASOUND IMAGING | Facility: HOSPITAL | Age: 40
End: 2025-08-07

## 2025-08-07 LAB
ALBUMIN SERPL ELPH-MCNC: 4.8 G/DL
ALP BLD-CCNC: 82 U/L
ALT SERPL-CCNC: 22 U/L
ANION GAP SERPL CALC-SCNC: 13 MMOL/L
AST SERPL-CCNC: 17 U/L
BASOPHILS # BLD AUTO: 0.05 K/UL
BASOPHILS NFR BLD AUTO: 1 %
BILIRUB SERPL-MCNC: 0.6 MG/DL
BUN SERPL-MCNC: 7 MG/DL
CALCIUM SERPL-MCNC: 10.2 MG/DL
CHLORIDE SERPL-SCNC: 102 MMOL/L
CHOLEST SERPL-MCNC: 189 MG/DL
CO2 SERPL-SCNC: 25 MMOL/L
CREAT SERPL-MCNC: 0.89 MG/DL
EGFRCR SERPLBLD CKD-EPI 2021: 110 ML/MIN/1.73M2
EOSINOPHIL # BLD AUTO: 0.26 K/UL
EOSINOPHIL NFR BLD AUTO: 5.1 %
ESTIMATED AVERAGE GLUCOSE: 94 MG/DL
GLUCOSE SERPL-MCNC: 94 MG/DL
HBA1C MFR BLD HPLC: 4.9 %
HBV SURFACE AB SER QL: REACTIVE
HCT VFR BLD CALC: 47.3 %
HCV AB SER QL: NONREACTIVE
HCV S/CO RATIO: 0.12 S/CO
HDLC SERPL-MCNC: 66 MG/DL
HGB BLD-MCNC: 15.9 G/DL
HIV1+2 AB SPEC QL IA.RAPID: NONREACTIVE
IMM GRANULOCYTES NFR BLD AUTO: 0.2 %
LDLC SERPL-MCNC: 104 MG/DL
LYMPHOCYTES # BLD AUTO: 1.33 K/UL
LYMPHOCYTES NFR BLD AUTO: 26.2 %
MAN DIFF?: NORMAL
MCHC RBC-ENTMCNC: 30.8 PG
MCHC RBC-ENTMCNC: 33.6 G/DL
MCV RBC AUTO: 91.5 FL
MONOCYTES # BLD AUTO: 0.42 K/UL
MONOCYTES NFR BLD AUTO: 8.3 %
NEUTROPHILS # BLD AUTO: 3 K/UL
NEUTROPHILS NFR BLD AUTO: 59.2 %
NONHDLC SERPL-MCNC: 123 MG/DL
PLATELET # BLD AUTO: 170 K/UL
POTASSIUM SERPL-SCNC: 4.7 MMOL/L
PROT SERPL-MCNC: 7 G/DL
RBC # BLD: 5.17 M/UL
RBC # FLD: 12.8 %
SODIUM SERPL-SCNC: 141 MMOL/L
TRIGL SERPL-MCNC: 104 MG/DL
TSH SERPL-ACNC: 1.38 UIU/ML
WBC # FLD AUTO: 5.07 K/UL

## 2025-08-10 LAB
HSV 1 IGG TYPE-SPECIFIC AB: <0.9 INDEX
HSV 2 IGG TYPE-SPECIFIC AB: 1.56 INDEX

## 2025-08-11 ENCOUNTER — APPOINTMENT (OUTPATIENT)
Dept: ULTRASOUND IMAGING | Facility: CLINIC | Age: 40
End: 2025-08-11
Payer: COMMERCIAL

## 2025-08-11 ENCOUNTER — OUTPATIENT (OUTPATIENT)
Dept: OUTPATIENT SERVICES | Facility: HOSPITAL | Age: 40
LOS: 1 days | End: 2025-08-11

## 2025-08-11 PROCEDURE — 76857 US EXAM PELVIC LIMITED: CPT | Mod: 26
